# Patient Record
Sex: FEMALE | Race: BLACK OR AFRICAN AMERICAN | Employment: PART TIME | URBAN - METROPOLITAN AREA
[De-identification: names, ages, dates, MRNs, and addresses within clinical notes are randomized per-mention and may not be internally consistent; named-entity substitution may affect disease eponyms.]

---

## 2017-12-30 ENCOUNTER — HOSPITAL ENCOUNTER (EMERGENCY)
Facility: CLINIC | Age: 32
Discharge: HOME | End: 2017-12-30
Attending: EMERGENCY MEDICINE | Admitting: EMERGENCY MEDICINE
Payer: COMMERCIAL

## 2017-12-30 VITALS
OXYGEN SATURATION: 100 % | RESPIRATION RATE: 17 BRPM | HEART RATE: 126 BPM | DIASTOLIC BLOOD PRESSURE: 65 MMHG | SYSTOLIC BLOOD PRESSURE: 115 MMHG | TEMPERATURE: 98.1 F

## 2017-12-30 DIAGNOSIS — R00.2 PALPITATIONS: ICD-10-CM

## 2017-12-30 LAB
ALBUMIN SERPL-MCNC: 2.6 G/DL (ref 3.4–5)
ALP SERPL-CCNC: 125 U/L (ref 40–150)
ALT SERPL W P-5'-P-CCNC: 20 U/L (ref 0–50)
ANION GAP SERPL CALCULATED.3IONS-SCNC: 9 MMOL/L (ref 3–14)
AST SERPL W P-5'-P-CCNC: 19 U/L (ref 0–45)
BASOPHILS # BLD AUTO: 0 10E9/L (ref 0–0.2)
BASOPHILS NFR BLD AUTO: 0.2 %
BILIRUB SERPL-MCNC: 0.1 MG/DL (ref 0.2–1.3)
BUN SERPL-MCNC: 5 MG/DL (ref 7–30)
CALCIUM SERPL-MCNC: 8.4 MG/DL (ref 8.5–10.1)
CHLORIDE SERPL-SCNC: 104 MMOL/L (ref 94–109)
CO2 SERPL-SCNC: 23 MMOL/L (ref 20–32)
CREAT SERPL-MCNC: 0.54 MG/DL (ref 0.52–1.04)
D DIMER PPP FEU-MCNC: <0.3 UG/ML FEU (ref 0–0.5)
DIFFERENTIAL METHOD BLD: ABNORMAL
EOSINOPHIL # BLD AUTO: 0.2 10E9/L (ref 0–0.7)
EOSINOPHIL NFR BLD AUTO: 1.6 %
ERYTHROCYTE [DISTWIDTH] IN BLOOD BY AUTOMATED COUNT: 13.5 % (ref 10–15)
GFR SERPL CREATININE-BSD FRML MDRD: >90 ML/MIN/1.7M2
GLUCOSE SERPL-MCNC: 184 MG/DL (ref 70–99)
HCT VFR BLD AUTO: 33.7 % (ref 35–47)
HGB BLD-MCNC: 11.3 G/DL (ref 11.7–15.7)
IMM GRANULOCYTES # BLD: 0 10E9/L (ref 0–0.4)
IMM GRANULOCYTES NFR BLD: 0.3 %
INTERPRETATION ECG - MUSE: NORMAL
LYMPHOCYTES # BLD AUTO: 2.5 10E9/L (ref 0.8–5.3)
LYMPHOCYTES NFR BLD AUTO: 20.4 %
MCH RBC QN AUTO: 27.6 PG (ref 26.5–33)
MCHC RBC AUTO-ENTMCNC: 33.5 G/DL (ref 31.5–36.5)
MCV RBC AUTO: 82 FL (ref 78–100)
MONOCYTES # BLD AUTO: 0.7 10E9/L (ref 0–1.3)
MONOCYTES NFR BLD AUTO: 5.8 %
NEUTROPHILS # BLD AUTO: 8.9 10E9/L (ref 1.6–8.3)
NEUTROPHILS NFR BLD AUTO: 71.7 %
NRBC # BLD AUTO: 0 10*3/UL
NRBC BLD AUTO-RTO: 0 /100
PLATELET # BLD AUTO: 308 10E9/L (ref 150–450)
POTASSIUM SERPL-SCNC: 3.2 MMOL/L (ref 3.4–5.3)
PROT SERPL-MCNC: 7.3 G/DL (ref 6.8–8.8)
RBC # BLD AUTO: 4.09 10E12/L (ref 3.8–5.2)
SODIUM SERPL-SCNC: 136 MMOL/L (ref 133–144)
WBC # BLD AUTO: 12.4 10E9/L (ref 4–11)

## 2017-12-30 PROCEDURE — 93005 ELECTROCARDIOGRAM TRACING: CPT

## 2017-12-30 PROCEDURE — 25000128 H RX IP 250 OP 636: Performed by: EMERGENCY MEDICINE

## 2017-12-30 PROCEDURE — 25000132 ZZH RX MED GY IP 250 OP 250 PS 637: Performed by: EMERGENCY MEDICINE

## 2017-12-30 PROCEDURE — 85379 FIBRIN DEGRADATION QUANT: CPT | Performed by: EMERGENCY MEDICINE

## 2017-12-30 PROCEDURE — 80053 COMPREHEN METABOLIC PANEL: CPT | Performed by: EMERGENCY MEDICINE

## 2017-12-30 PROCEDURE — 85025 COMPLETE CBC W/AUTO DIFF WBC: CPT | Performed by: EMERGENCY MEDICINE

## 2017-12-30 PROCEDURE — 96360 HYDRATION IV INFUSION INIT: CPT

## 2017-12-30 PROCEDURE — 96361 HYDRATE IV INFUSION ADD-ON: CPT

## 2017-12-30 PROCEDURE — 99284 EMERGENCY DEPT VISIT MOD MDM: CPT | Mod: 25

## 2017-12-30 RX ORDER — SODIUM CHLORIDE 9 MG/ML
INJECTION, SOLUTION INTRAVENOUS CONTINUOUS
Status: DISCONTINUED | OUTPATIENT
Start: 2017-12-30 | End: 2017-12-30 | Stop reason: HOSPADM

## 2017-12-30 RX ORDER — POTASSIUM CHLORIDE 1500 MG/1
40 TABLET, EXTENDED RELEASE ORAL DAILY
Status: DISCONTINUED | OUTPATIENT
Start: 2017-12-30 | End: 2017-12-30 | Stop reason: HOSPADM

## 2017-12-30 RX ADMIN — POTASSIUM CHLORIDE 40 MEQ: 1500 TABLET, EXTENDED RELEASE ORAL at 15:39

## 2017-12-30 RX ADMIN — SODIUM CHLORIDE 1000 ML: 9 INJECTION, SOLUTION INTRAVENOUS at 15:39

## 2017-12-30 RX ADMIN — SODIUM CHLORIDE 1000 ML: 9 INJECTION, SOLUTION INTRAVENOUS at 14:38

## 2017-12-30 ASSESSMENT — ENCOUNTER SYMPTOMS
ABDOMINAL PAIN: 0
SHORTNESS OF BREATH: 1
NUMBNESS: 1
PALPITATIONS: 1

## 2017-12-30 NOTE — ED NOTES
2nd liter of NS completed. Pt ambulated and HR increased to 120s. Did not elevate any more than that. No hypoxia. Dr. Hubbard made aware.

## 2017-12-30 NOTE — ED AVS SNAPSHOT
Emergency Department    [address] [facility] [address]    Phone:  [phone]    Fax:  [phone]                                       Ema Khan   MRN: [id number]    Department:   Emergency Department   Date of Visit:  12/30/2017           Patient Information     Date Of Birth          1985        Your diagnoses for this visit were:     Palpitations        You were seen by Pearl Hubbard MD.      Follow-up Information     Follow up with your OB provider. Schedule an appointment as soon as possible for a visit in 2 days.        Follow up with  Emergency Department.    Specialty:  EMERGENCY MEDICINE    Why:  As needed, If symptoms worsen    Contact information:    [address] [facility] [address] [phone]        Schedule an appointment as soon as possible for a visit with [facility]    Why:  As needed    Contact information:    [facility], [address]  [address]  [facility] [address]          Discharge Instructions         Discharge Instructions  Palpitations    Palpitations are an unusual awareness of your heartbeat. People often describe this as the heart skipping, fluttering, racing, irregular, or pounding. At this time, your doctor has found no signs that your palpitations are due to a serious or life-threatening condition. However, sometimes there is a serious problem that does not show up right away. It is important that you follow up with your doctor within 1 week, or as directed by your doctor today, to check for other serious problems. You may need more blood tests, a stress test, heart monitoring, or other tests.    Palpitations can be caused by caffeine, cigarettes, diet pills, energy drinks or supplements, other stimulants, and medications and street drugs. They can also be caused by anxiety, hormone conditions such as high thyroid, and other medical conditions. Sometimes they are a sign of abnormal  rhythm in the heart, so you may need your heart checked.    Return to the Emergency Department if:    You get chest pain or tightness.    You are short of breath.    You get very weak or tired.    You pass out or faint.    Your heart rate is over 120 beats per minute for more than 10 minutes while you are resting.    You have any new symptoms, like fever, cough, numb legs, or you cough up blood.    You have anything else that worries you.    What can I do to help myself?    Fill any prescriptions the doctor gave you and take them right away.     Follow your doctor s instructions about the prescription medicines you are on. Sometimes the doctor may tell you to stop taking a medicine or change the dose.    If you smoke, this may be a good time to quit! The less you can smoke, the better.    Do not use energy drinks, diet pills, or stimulants. Limit your use of caffeine.    Follow up with your doctor:    Within 1 week, or sooner if instructed.    If you keep having palpitations.    If you need help to quit smoking.  If you were given a prescription for medicine here today, be sure to read all of the information (including the package insert) that comes with your prescription.  This will include important information about the medicine, its side effects, and any warnings that you need to know about.  The pharmacist who fills the prescription can provide more information and answer questions you may have about the medicine.  If you have questions or concerns that the pharmacist cannot address, please call or return to the Emergency Department.         Opioid Medication Information    Pain medications are among the most commonly prescribed medicines, so we are including this information for all our patients. If you did not receive pain medication or get a prescription for pain medicine, you can ignore it.     You may have been given a prescription for an opioid (narcotic) pain medicine and/or have received a pain medicine  while here in the Emergency Department. These medicines can make you drowsy or impaired. You must not drive, operate dangerous equipment, or engage in any other dangerous activities while taking these medications. If you drive while taking these medications, you could be arrested for DUI, or driving under the influence. Do not drink any alcohol while you are taking these medications.     Opioid pain medications can cause addiction. If you have a history of chemical dependency of any type, you are at a higher risk of becoming addicted to pain medications.  Only take these prescribed medications to treat your pain when all other options have been tried. Take it for as short a time and as few doses as possible. Store your pain pills in a secure place, as they are frequently stolen and provide a dangerous opportunity for children or visitors in your house to start abusing these powerful medications. We will not replace any lost or stolen medicine.  As soon as your pain is better, you should flush all your remaining medication.     Many prescription pain medications contain Tylenol  (acetaminophen), including Vicodin , Tylenol #3 , Norco , Lortab , and Percocet .  You should not take any extra pills of Tylenol  if you are using these prescription medications or you can get very sick.  Do not ever take more than 3000 mg of acetaminophen in any 24 hour period.    All opioids tend to cause constipation. Drink plenty of water and eat foods that have a lot of fiber, such as fruits, vegetables, prune juice, apple juice and high fiber cereal.  Take a laxative if you don t move your bowels at least every other day. Miralax , Milk of Magnesia, Colace , or Senna  can be used to keep you regular.      Remember that you can always come back to the Emergency Department if you are not able to see your regular doctor in the amount of time listed above, if you get any new symptoms, or if there is anything that worries you.        24  Hour Appointment Hotline       To make an appointment at any [facility], call [phone] ([phone]). If you don't have a family doctor or clinic, we will help you find one. [facility] are conveniently located to serve the needs of you and your family.             Review of your medicines      Our records show that you are taking the medicines listed below. If these are incorrect, please call your family doctor or clinic.        Dose / Directions Last dose taken    guaiFENesin-codeine 100-10 MG/5ML Soln solution   Commonly known as:  ROBITUSSIN AC   Dose:  2 tsp.   Quantity:  120 mL        Take 10 mLs by mouth every 4 hours as needed for cough No driving a car or drinking alcohol for 6 hours after taking this medication.   Refills:  0        ibuprofen 400-800 mg tablet   Commonly known as:  ADVIL,MOTRIN   Dose:  400-800 mg   Quantity:  60 tablet        Take 1-2 tablets by mouth every 6 hours as needed.   Refills:  0        methyldopa 250 MG tablet   Commonly known as:  ALDOMET   Dose:  250 mg        Take 250 mg by mouth 2 times daily.   Refills:  0        NIFEDIPINE PO        Refills:  0                Procedures and tests performed during your visit     CBC with platelets differential    Comprehensive metabolic panel    D dimer quantitative    EKG 12-lead, tracing only      Orders Needing Specimen Collection     None      Pending Results     Date and Time Order Name Status Description    12/30/2017 1407 EKG 12-lead, tracing only Preliminary             Pending Culture Results     No orders found from 12/28/2017 to 12/31/2017.            Pending Results Instructions     If you had any lab results that were not finalized at the time of your Discharge, you can call the ED Lab Result RN at [phone]. You will be contacted by this team for any positive Lab results or changes in treatment. The nurses are available 7 days a week from 10A to 6:30P.  You can leave a message 24 hours per day and they  will return your call.        Test Results From Your Hospital Stay        12/30/2017  2:32 PM      Component Results     Component Value Ref Range & Units Status    WBC 12.4 (H) 4.0 - 11.0 10e9/L Final    RBC Count 4.09 3.8 - 5.2 10e12/L Final    Hemoglobin 11.3 (L) 11.7 - 15.7 g/dL Final    Hematocrit 33.7 (L) 35.0 - 47.0 % Final    MCV 82 78 - 100 fl Final    MCH 27.6 26.5 - 33.0 pg Final    MCHC 33.5 31.5 - 36.5 g/dL Final    RDW 13.5 10.0 - 15.0 % Final    Platelet Count 308 150 - 450 10e9/L Final    Diff Method Automated Method  Final    % Neutrophils 71.7 % Final    % Lymphocytes 20.4 % Final    % Monocytes 5.8 % Final    % Eosinophils 1.6 % Final    % Basophils 0.2 % Final    % Immature Granulocytes 0.3 % Final    Nucleated RBCs 0 0 /100 Final    Absolute Neutrophil 8.9 (H) 1.6 - 8.3 10e9/L Final    Absolute Lymphocytes 2.5 0.8 - 5.3 10e9/L Final    Absolute Monocytes 0.7 0.0 - 1.3 10e9/L Final    Absolute Eosinophils 0.2 0.0 - 0.7 10e9/L Final    Absolute Basophils 0.0 0.0 - 0.2 10e9/L Final    Abs Immature Granulocytes 0.0 0 - 0.4 10e9/L Final    Absolute Nucleated RBC 0.0  Final         12/30/2017  2:56 PM      Component Results     Component Value Ref Range & Units Status    Sodium 136 133 - 144 mmol/L Final    Potassium 3.2 (L) 3.4 - 5.3 mmol/L Final    Chloride 104 94 - 109 mmol/L Final    Carbon Dioxide 23 20 - 32 mmol/L Final    Anion Gap 9 3 - 14 mmol/L Final    Glucose 184 (H) 70 - 99 mg/dL Final    Urea Nitrogen 5 (L) 7 - 30 mg/dL Final    Creatinine 0.54 0.52 - 1.04 mg/dL Final    GFR Estimate >90 >60 mL/min/1.7m2 Final    Non  GFR Calc    GFR Estimate If Black >90 >60 mL/min/1.7m2 Final    African American GFR Calc    Calcium 8.4 (L) 8.5 - 10.1 mg/dL Final    Bilirubin Total 0.1 (L) 0.2 - 1.3 mg/dL Final    Albumin 2.6 (L) 3.4 - 5.0 g/dL Final    Protein Total 7.3 6.8 - 8.8 g/dL Final    Alkaline Phosphatase 125 40 - 150 U/L Final    ALT 20 0 - 50 U/L Final    AST 19 0 - 45 U/L  Final         12/30/2017  3:02 PM      Component Results     Component Value Ref Range & Units Status    D Dimer <0.3 0.0 - 0.50 ug/ml FEU Final    This D-dimer assay is intended for use in conjunction with a clinical pretest   probability assessment model to exclude pulmonary embolism (PE) and deep   venous thrombosis (DVT) in outpatients suspected of PE or DVT. The cut-off   value is 0.5 ug/mL FEU.                  Clinical Quality Measure: Blood Pressure Screening     Your blood pressure was checked while you were in the emergency department today. The last reading we obtained was  BP: 115/65 . Please read the guidelines below about what these numbers mean and what you should do about them.  If your systolic blood pressure (the top number) is less than 120 and your diastolic blood pressure (the bottom number) is less than 80, then your blood pressure is normal. There is nothing more that you need to do about it.  If your systolic blood pressure (the top number) is 120-139 or your diastolic blood pressure (the bottom number) is 80-89, your blood pressure may be higher than it should be. You should have your blood pressure rechecked within a year by a primary care provider.  If your systolic blood pressure (the top number) is 140 or greater or your diastolic blood pressure (the bottom number) is 90 or greater, you may have high blood pressure. High blood pressure is treatable, but if left untreated over time it can put you at risk for heart attack, stroke, or kidney failure. You should have your blood pressure rechecked by a primary care provider within the next 4 weeks.  If your provider in the emergency department today gave you specific instructions to follow-up with your doctor or provider even sooner than that, you should follow that instruction and not wait for up to 4 weeks for your follow-up visit.        Thank you for choosing [facility]       Thank you for choosing [facility] for your care. Our goal is always  "to provide you with excellent care. Hearing back from our patients is one way we can continue to improve our services. Please take a few minutes to complete the written survey that you may receive in the mail after you visit with us. Thank you!        [employer] Information     [employer] lets you send messages to your doctor, view your test results, renew your prescriptions, schedule appointments and more. To sign up, go to [email or web address] . Click on \"Log in\" on the left side of the screen, which will take you to the Welcome page. Then click on \"Sign up Now\" on the right side of the page.     You will be asked to enter the access code listed below, as well as some personal information. Please follow the directions to create your username and password.     Your access code is: [email or web address]  Expires: 3/30/2018  5:10 PM     Your access code will  in 90 days. If you need help or a new code, please call your [facility] clinic or [phone].        Care EveryWhere ID     This is your Care EveryWhere ID. This could be used by other organizations to access your [facility] medical records  [id number]        Equal Access to Services     NEFTALY MANJARREZ : Jalen Huang, angela pabon, noelle rocha, elliott white. So [facility] [phone].    ATENCIÓN: Si habla español, tiene a strickland disposición servicios gratuitos de asistencia lingüística. King al [phone].    We comply with applicable federal civil rights laws and [place] laws. We do not discriminate on the basis of race, color, national origin, age, disability, sex, sexual orientation, or gender identity.            After Visit Summary       This is your record. Keep this with you and show to your community pharmacist(s) and doctor(s) at your next visit.                  "

## 2017-12-30 NOTE — ED NOTES
Bed: ED19  Expected date:   Expected time:   Means of arrival:   Comments:  Hugo Donaldson Preg 22 weeks with Chest pain and Tachycardia 32 female ETA 1400

## 2017-12-30 NOTE — ED AVS SNAPSHOT
Emergency Department    [address] [facility] [address]    Phone:  [phone]    Fax:  [phone]                                       Ema Khan   MRN: [id number]    Department:   Emergency Department   Date of Visit:  12/30/2017           After Visit Summary Signature Page     I have received my discharge instructions, and my questions have been answered. I have discussed any challenges I see with this plan with the nurse or doctor.    ..........................................................................................................................................  Patient/Patient Representative Signature      ..........................................................................................................................................  Patient Representative Print Name and Relationship to Patient    ..................................................               ................................................  Date                                            Time    ..........................................................................................................................................  Reviewed by Signature/Title    ...................................................              ..............................................  Date                                                            Time

## 2017-12-30 NOTE — ED PROVIDER NOTES
History     Chief Complaint:  Palpitations       HPI   Ema Khan is a 32 year old female, who is 22 weeks pregnant, who presents with palpitations beginning an hour or so ago once she sat down at work this morning. She states she took her high blood pressure medication then since she forgot to take it earlier in the day. She continued to feel more faint and had tingling in her hands, and she went to go get food. The tingling and palpitations continued, so she called the nurse line who sent her to the ER. Patient endorses shortness of breath with movement and ankle swelling. Patient denies abdominal pain. Patient has two live children.     PE/DVT Risk Factors   Personal History: Negative  Recent Travel: Negative   Recent Surgery/Hospitalization: Negative  Tobacco: Negative  Family History: Negative  Hormone Use: Negative, however patient is pregnant  Cancer: Negative  Trauma: Negative      Allergies:  Amoxicillin  Penicillins     Medications:    Nifedipine  Robitussin  Advil  Aldomet    Past Medical History:    HTN    Past Surgical History:    History reviewed. No pertinent past surgical history.    Family History:    History reviewed. No pertinent family history.    Social History:  Marital Status: Single  Presents to the ED via EMS.   Tobacco Use: Former smoker (for 15 years, quit date 1/8/2012)  Alcohol Use: No       Review of Systems   Respiratory: Positive for shortness of breath.    Cardiovascular: Positive for palpitations and leg swelling.   Gastrointestinal: Negative for abdominal pain.   Neurological: Positive for numbness.   10 point review of systems performed and is negative except as above and in HPI.    Physical Exam   First Vitals:  BP: 134/71  Pulse: 126  Temp: 98.1  F (36.7  C)  Resp: 16  SpO2: 99 %      Physical Exam  General: Resting on the gurney, appears somewhat uncomfortable  Head:  The scalp, face, and head appear normal  Mouth/Throat: Mucus membranes are moist  CV:  Tachycardic.      Normal S1 and S2  No pathological murmur   Resp:  Breath sounds clear and equal bilaterally    Non-labored, no retractions or accessory muscle use    No coarseness    No wheezing   GI:  Abdomen is soft, no rigidity    No tenderness to palpation  MS:  Normal motor assessment of all extremities.    Good capillary refill noted.    No lower edema, redness, swelling, excess warmth   Skin:  No rash or lesions noted.  Neuro:  Speech is normal and fluent. No apparent deficit.  Psych:  Awake. Alert.  Normal affect.      Appropriate interactions.    Emergency Department Course   ECG:  @ 1419  Indication: Palpitations   Vent. Rate 123 bpm. WI interval 152 ms. QRS duration 62 ms. QT/QTc 302/432 ms. P-R-T axis 45 41 2.   Sinus tachycardia. Nonspecific T wave abnormality. Abnormal ECG.    Read @ 1430 by Dr. Hubbard.    Laboratory:  CBC:  WBC 12.4 (H), HGB 11.3 (L), , otherwise WNL  CMP: Potassium 3.2 (L), glucose 184 (H), BUN 5 (L), calcium 8.4 (L), bilirubin 0.1 (L), albumin 2.6 (L), otherwise WNL (Creatinine 0.54)  D Dimer: <0.3    Interventions:  1438: Normal Saline, 1 liter, IV bolus   1539: Normal Saline, 1 liter, IV bolus   1539: Potassium chloride, 40 mEq, oral    Emergency Department Course:  Nursing notes and vitals reviewed.  1407: I performed an exam of the patient as documented above.  The above workup was undertaken.  1527: I rechecked the patient and discussed results.  1656: I consulted with TAYLOR Perez of [facility].  1657: I rechecked the patient and discussed results.   Findings and plan explained to the Patient. Patient discharged home, status improved, with instructions regarding supportive care, medications, and reasons to return as well as the importance of close follow-up was reviewed.    Impression & Plan      Medical Decision Making:  Ema Khan is a 32 year old female presented with a sensation of palpitations.  The work up in the Emergency Department is overall  negative, monitoring and EKG have not shown any abnormal heart rhythms or concerning morphologies.  I considered a broad differential diagnosis including acute coronary syndrome, myocardial infarction, pulmonary embolism, electrolyte abnormalities, drug reaction, anxiety, amongst others.  Electrolytes are normal besides a low potassium which was replaced in the ED. The patient is not anemic. The patient is pregnant.  No EKG changes or troponin elevation concerning for acute coronary syndrome.  D-dimer is negative so I doubt PE.  No serious etiology for the palpitations were detected today during this visit and I feel the patient is safe for discharge.   Close follow up with primary care is indicated should the symptoms continue, as further work up may be performed; this was made clear to the patient, who understands.     Diagnosis:    ICD-10-CM    1. Palpitations R00.2        Disposition:  Discharged to home.       I, Michelle Whitmore, am serving as a scribe on 12/30/2017 at 2:07 PM to personally document services performed by Dr. Hubbard based on my observations and the provider's statements to me.    EMERGENCY DEPARTMENT       Pearl Hubbard MD  01/02/18 0809

## 2018-08-29 ENCOUNTER — HOSPITAL ENCOUNTER (EMERGENCY)
Facility: CLINIC | Age: 33
Discharge: HOME | End: 2018-08-29
Attending: EMERGENCY MEDICINE | Admitting: EMERGENCY MEDICINE
Payer: COMMERCIAL

## 2018-08-29 VITALS
OXYGEN SATURATION: 100 % | TEMPERATURE: 98 F | WEIGHT: 187 LBS | HEIGHT: 65 IN | SYSTOLIC BLOOD PRESSURE: 121 MMHG | BODY MASS INDEX: 31.16 KG/M2 | DIASTOLIC BLOOD PRESSURE: 76 MMHG | RESPIRATION RATE: 16 BRPM

## 2018-08-29 DIAGNOSIS — R51.9 ACUTE NONINTRACTABLE HEADACHE, UNSPECIFIED HEADACHE TYPE: ICD-10-CM

## 2018-08-29 LAB — HCG SERPL QL: NEGATIVE

## 2018-08-29 PROCEDURE — 96361 HYDRATE IV INFUSION ADD-ON: CPT

## 2018-08-29 PROCEDURE — 99284 EMERGENCY DEPT VISIT MOD MDM: CPT | Mod: 25

## 2018-08-29 PROCEDURE — 96374 THER/PROPH/DIAG INJ IV PUSH: CPT

## 2018-08-29 PROCEDURE — 25000128 H RX IP 250 OP 636: Performed by: EMERGENCY MEDICINE

## 2018-08-29 PROCEDURE — 96375 TX/PRO/DX INJ NEW DRUG ADDON: CPT

## 2018-08-29 PROCEDURE — 84703 CHORIONIC GONADOTROPIN ASSAY: CPT | Performed by: EMERGENCY MEDICINE

## 2018-08-29 RX ORDER — SODIUM CHLORIDE 9 MG/ML
1000 INJECTION, SOLUTION INTRAVENOUS CONTINUOUS
Status: DISCONTINUED | OUTPATIENT
Start: 2018-08-29 | End: 2018-08-29 | Stop reason: HOSPADM

## 2018-08-29 RX ORDER — METOCLOPRAMIDE HYDROCHLORIDE 5 MG/ML
10 INJECTION INTRAMUSCULAR; INTRAVENOUS ONCE
Status: COMPLETED | OUTPATIENT
Start: 2018-08-29 | End: 2018-08-29

## 2018-08-29 RX ORDER — DIPHENHYDRAMINE HYDROCHLORIDE 50 MG/ML
25 INJECTION INTRAMUSCULAR; INTRAVENOUS ONCE
Status: COMPLETED | OUTPATIENT
Start: 2018-08-29 | End: 2018-08-29

## 2018-08-29 RX ADMIN — DIPHENHYDRAMINE HYDROCHLORIDE 25 MG: 50 INJECTION, SOLUTION INTRAMUSCULAR; INTRAVENOUS at 11:09

## 2018-08-29 RX ADMIN — SODIUM CHLORIDE 1000 ML: 9 INJECTION, SOLUTION INTRAVENOUS at 11:08

## 2018-08-29 RX ADMIN — METOCLOPRAMIDE 10 MG: 5 INJECTION, SOLUTION INTRAMUSCULAR; INTRAVENOUS at 11:09

## 2018-08-29 ASSESSMENT — ENCOUNTER SYMPTOMS
HEADACHES: 1
VOMITING: 1
PHOTOPHOBIA: 1
FEVER: 0
NAUSEA: 1
DIZZINESS: 1

## 2018-08-29 NOTE — ED AVS SNAPSHOT
Emergency Department    [address] [facility] [address]    Phone:  [phone]    Fax:  [phone]                                       Ema Khan   MRN: [id number]    Department:   Emergency Department   Date of Visit:  8/29/2018           After Visit Summary Signature Page     I have received my discharge instructions, and my questions have been answered. I have discussed any challenges I see with this plan with the nurse or doctor.    ..........................................................................................................................................  Patient/Patient Representative Signature      ..........................................................................................................................................  Patient Representative Print Name and Relationship to Patient    ..................................................               ................................................  Date                                            Time    ..........................................................................................................................................  Reviewed by Signature/Title    ...................................................              ..............................................  Date                                                            Time          22EPIC Rev 08/18

## 2018-08-29 NOTE — ED AVS SNAPSHOT
Emergency Department    [address] [facility] [address]    Phone:  [phone]    Fax:  [phone]                                       Ema Khan   MRN: [id number]    Department:   Emergency Department   Date of Visit:  8/29/2018           Patient Information     Date Of Birth          1985        Your diagnoses for this visit were:     Acute nonintractable headache, unspecified headache type        You were seen by Ant Lomeli MD.      Follow-up Information     Follow up with  Emergency Department.    Specialty:  EMERGENCY MEDICINE    Why:  As needed    Contact information:    [address] [facility] [address] [phone]        Follow up with Primary Care.    Why:  As needed        Discharge Instructions       Discharge Instructions  Headache    You were seen today for a headache. Headaches may be caused by many different things such as muscle tension, sinus inflammation, anxiety and stress, having too little sleep, too much alcohol, some medical conditions or injury. You may have a migraine, which is caused by changes in the blood vessels in your head.  At this time your provider does not find that your headache is a sign of anything dangerous or life-threatening.  However, sometimes the signs of serious illness do not show up right away.      Generally, every Emergency Department visit should have a follow-up clinic visit with either a primary or a specialty clinic/provider. Please follow-up as instructed by your emergency provider today.    Return to the Emergency Department if:    You get a new fever of 100.4 F or higher.    Your headache gets much worse.    You get a stiff neck with your headache.    You get a new headache that is significantly different or worse than headaches you have had before.    You are vomiting (throwing up) and cannot keep food or water down.    You have blurry or double vision or other problems with your  eyes.    You have a new weakness on one side of your body.    You have difficulty with balance which is new.    You or your family thinks you are confused.    You have a seizure.    What can I do to help myself?    Pain medications - You may take a pain medication such as Tylenol  (acetaminophen), Advil , Motrin  (ibuprofen) or Aleve  (naproxen).    Take a pain reliever as soon as you notice symptoms.  Starting medications as soon as you start to have symptoms may lessen the amount of pain you have.    Relaxing in a quiet, dark room may help.    Get enough sleep and eat meals regularly.    You may need to watch for certain foods or other things which may trigger your headaches.  Keeping a journal of your headaches and possible triggers may help you and your primary provider to identify things which you should avoid which may be causing your headaches.  If you were given a prescription for medicine here today, be sure to read all of the information (including the package insert) that comes with your prescription.  This will include important information about the medicine, its side effects, and any warnings that you need to know about.  The pharmacist who fills the prescription can provide more information and answer questions you may have about the medicine.  If you have questions or concerns that the pharmacist cannot address, please call or return to the Emergency Department.   Remember that you can always come back to the Emergency Department if you are not able to see your regular provider in the amount of time listed above, if you get any new symptoms, or if there is anything that worries you.     24 Hour Appointment Hotline       To make an appointment at any [facility] call [phone] ([phone]). If you don't have a family doctor or clinic, we will help you find one. [facility] are conveniently located to serve the needs of you and your family.             Review of your medicines       Our records show that you are taking the medicines listed below. If these are incorrect, please call your family doctor or clinic.        Dose / Directions Last dose taken    guaiFENesin-codeine 100-10 MG/5ML Soln solution   Commonly known as:  ROBITUSSIN AC   Dose:  2 tsp.   Quantity:  120 mL        Take 10 mLs by mouth every 4 hours as needed for cough No driving a car or drinking alcohol for 6 hours after taking this medication.   Refills:  0        ibuprofen 400-800 mg tablet   Commonly known as:  ADVIL,MOTRIN   Dose:  400-800 mg   Quantity:  60 tablet        Take 1-2 tablets by mouth every 6 hours as needed.   Refills:  0        methyldopa 250 MG tablet   Commonly known as:  ALDOMET   Dose:  250 mg        Take 250 mg by mouth 2 times daily.   Refills:  0        NIFEDIPINE PO        Refills:  0                Procedures and tests performed during your visit     HCG qualitative Blood    Peripheral IV catheter      Orders Needing Specimen Collection     None      Pending Results     No orders found from 8/27/2018 to 8/30/2018.            Pending Culture Results     No orders found from 8/27/2018 to 8/30/2018.            Pending Results Instructions     If you had any lab results that were not finalized at the time of your Discharge, you can call the ED Lab Result RN at [phone]. You will be contacted by this team for any positive Lab results or changes in treatment. The nurses are available 7 days a week from 10A to 6:30P.  You can leave a message 24 hours per day and they will return your call.        Test Results From Your Hospital Stay        8/29/2018 11:36 AM      Component Results     Component Value Ref Range & Units Status    HCG Qualitative Serum Negative NEG^Negative Final    This test is for screening purposes.  Results should be interpreted along with   the clinical picture.  Confirmation testing is available if warranted by   ordering FFM178, HCG Quantitative Pregnancy.                  Clinical  Quality Measure: Blood Pressure Screening     Your blood pressure was checked while you were in the emergency department today. The last reading we obtained was  BP: 121/76 . Please read the guidelines below about what these numbers mean and what you should do about them.  If your systolic blood pressure (the top number) is less than 120 and your diastolic blood pressure (the bottom number) is less than 80, then your blood pressure is normal. There is nothing more that you need to do about it.  If your systolic blood pressure (the top number) is 120-139 or your diastolic blood pressure (the bottom number) is 80-89, your blood pressure may be higher than it should be. You should have your blood pressure rechecked within a year by a primary care provider.  If your systolic blood pressure (the top number) is 140 or greater or your diastolic blood pressure (the bottom number) is 90 or greater, you may have high blood pressure. High blood pressure is treatable, but if left untreated over time it can put you at risk for heart attack, stroke, or kidney failure. You should have your blood pressure rechecked by a primary care provider within the next 4 weeks.  If your provider in the emergency department today gave you specific instructions to follow-up with your doctor or provider even sooner than that, you should follow that instruction and not wait for up to 4 weeks for your follow-up visit.        Thank you for choosing [facility]       Thank you for choosing [facility] for your care. Our goal is always to provide you with excellent care. Hearing back from our patients is one way we can continue to improve our services. Please take a few minutes to complete the written survey that you may receive in the mail after you visit with us. Thank you!        [employer] Information     [employer] lets you send messages to your doctor, view your test results, renew your prescriptions, schedule appointments and more. To sign up, go to  [email or web address] . Click on \"Log in\" on the left side of the screen, which will take you to the Welcome page. Then click on \"Sign up Now\" on the right side of the page.     You will be asked to enter the access code listed below, as well as some personal information. Please follow the directions to create your username and password.     Your access code is: [email or web address]  Expires: 2018 12:18 PM     Your access code will  in 90 days. If you need help or a new code, please call your [facility] clinic or [phone].        Care EveryWhere ID     This is your Care EveryWhere ID. This could be used by other organizations to access your [facility] medical records  [id number]        Equal Access to Services     NEFTALY MANJARREZ : Jalen Huang, angela pabon, noelle rocha, elliott white. So [facility] [phone].    ATENCIÓN: Si habla español, tiene a strickland disposición servicios gratuitos de asistencia lingüística. Llame al [phone].    We comply with applicable federal civil rights laws and [place] laws. We do not discriminate on the basis of race, color, national origin, age, disability, sex, sexual orientation, or gender identity.            After Visit Summary       This is your record. Keep this with you and show to your community pharmacist(s) and doctor(s) at your next visit.                  "

## 2018-08-29 NOTE — DISCHARGE INSTRUCTIONS
Discharge Instructions  Headache    You were seen today for a headache. Headaches may be caused by many different things such as muscle tension, sinus inflammation, anxiety and stress, having too little sleep, too much alcohol, some medical conditions or injury. You may have a migraine, which is caused by changes in the blood vessels in your head.  At this time your provider does not find that your headache is a sign of anything dangerous or life-threatening.  However, sometimes the signs of serious illness do not show up right away.      Generally, every Emergency Department visit should have a follow-up clinic visit with either a primary or a specialty clinic/provider. Please follow-up as instructed by your emergency provider today.    Return to the Emergency Department if:    You get a new fever of 100.4 F or higher.    Your headache gets much worse.    You get a stiff neck with your headache.    You get a new headache that is significantly different or worse than headaches you have had before.    You are vomiting (throwing up) and cannot keep food or water down.    You have blurry or double vision or other problems with your eyes.    You have a new weakness on one side of your body.    You have difficulty with balance which is new.    You or your family thinks you are confused.    You have a seizure.    What can I do to help myself?    Pain medications - You may take a pain medication such as Tylenol  (acetaminophen), Advil , Motrin  (ibuprofen) or Aleve  (naproxen).    Take a pain reliever as soon as you notice symptoms.  Starting medications as soon as you start to have symptoms may lessen the amount of pain you have.    Relaxing in a quiet, dark room may help.    Get enough sleep and eat meals regularly.    You may need to watch for certain foods or other things which may trigger your headaches.  Keeping a journal of your headaches and possible triggers may help you and your primary provider to identify  things which you should avoid which may be causing your headaches.  If you were given a prescription for medicine here today, be sure to read all of the information (including the package insert) that comes with your prescription.  This will include important information about the medicine, its side effects, and any warnings that you need to know about.  The pharmacist who fills the prescription can provide more information and answer questions you may have about the medicine.  If you have questions or concerns that the pharmacist cannot address, please call or return to the Emergency Department.   Remember that you can always come back to the Emergency Department if you are not able to see your regular provider in the amount of time listed above, if you get any new symptoms, or if there is anything that worries you.

## 2018-08-29 NOTE — ED PROVIDER NOTES
"  History     Chief Complaint:  Headache    HPI   Ema Khan is a 33 year old female who presents to the emergency department today for evaluation of a headache. The patient reports that on Monday, 08/27/18, she started to have a headache and was dizzy and vomited twice. She states she went to bed Monday and woke up on Tuesday, 08/28/18, with the same pounding headache and was dizzy and threw up. She states she took ibuprofen Tuesday, 08/28/18, which did not get rid of her headache but relieved it somewhat. She states then this morning she started to see stars, which she was told to come into the ED due to her high blood pressure. She also endorses some nausea and feeling shaky. The patient denies leg swelling, but she notes her legs were \"burning\" this morning. The patient also denies fevers.    Allergies:  Amoxicillin  Penicillins    Medications:    guaiFENesin-codeine (ROBITUSSIN AC) 100-10 MG/5ML SOLN  ibuprofen (ADVIL,MOTRIN) 400-800 mg tablet  methyldopa (ALDOMET) 250 MG tablet  NIFEDIPINE PO    Past Medical History:    Hypertension    Past Surgical History:    History reviewed. No pertinent surgical history.    Family History:    History reviewed. No pertinent family history.    Social History:  Smoking Status: Former Smoker  Smokeless Tobacco: Former User  Alcohol Use: Negative    Marital Status:  Single     Review of Systems   Constitutional: Negative for fever.   Eyes: Positive for photophobia and visual disturbance.   Cardiovascular: Negative for leg swelling.   Gastrointestinal: Positive for nausea and vomiting.   Neurological: Positive for dizziness and headaches.   All other systems reviewed and are negative.    Physical Exam     Patient Vitals for the past 24 hrs:   BP Temp Temp src Heart Rate Resp SpO2 Height Weight   08/29/18 1200 121/76 - - - - 100 % - -   08/29/18 1145 - - - - - 99 % - -   08/29/18 1130 (!) 130/99 - - - - 100 % - -   08/29/18 1110 134/85 - - - - 100 % - -   08/29/18 1055 " "(!) 141/100 98  F (36.7  C) Oral 102 16 99 % 1.651 m (5' 5\") 84.8 kg (187 lb)      Physical Exam  Eyes:  The pupils are equal and round    Conjunctivae and sclerae are normal  ENT:    The nose is normal    Pinnae are normal  CV:  Regular rate and rhythm     No edema  Resp:  Lungs are clear    Non-labored    No rales    No wheezing   GI:  Abdomen is soft and non-tender, there is no rigidity    No distension  MS:  Normal muscular tone    No asymmetric leg swelling  Skin:  No rash or acute skin lesions noted  Neuro:   Awake, alert, GCS 15    Speech is normal and fluent    Face is symmetric    Moves all extremities    Normal finger-nose-finger     strength equal bilaterally    Equal sensation bilaterally    Hip flexion 5/5 bilaterally     PERRL    EOMI. No nystagmus    Emergency Department Course     Laboratory:  Laboratory findings were communicated with the patient who voiced understanding of the findings.    HCG Qualitative Blood: negative    Interventions:  1108 NS 1000 mL IV  1109 Reglan 10 mg IV  1109 Benadryl 25 mg IV    Emergency Department Course:    1053 Nursing notes and vitals reviewed.    1059 I performed an exam of the patient as documented above.     1100 IV was inserted and blood was drawn for laboratory testing, results above.     1130 Recheck and update.     1217 I personally reviewed the laboratory results with the patient and answered all related questions  prior to discharge.    Impression & Plan      Medical Decision Making:  Ema Khan is a 33 year old female who presents to the emergency department today for evaluation of headache. It has been ongoing for about 3 days now. She initially had some improvement with ibuprofen but has return of her headache which was severe this morning. She took an ibuprofen this morning about an hour prior to arrival, but continues to have a headache. She reports she is seeing stars. Neurologic exam here is normal. Blood pressure is slightly elevated. She " was given IV fluids as well as Reglan and benadryl and reports resolution of her headache. She is drowsy so I ddi recommend someone give her a ride home. At this time, I have low suspicion for meningitis,m hemorrhage, or other serious cause of headache. I recommend she rest today and and follow up with primary care. Return to the ED for any new or concerning symptoms.     Diagnosis:    ICD-10-CM    1. Acute nonintractable headache, unspecified headache type R51      Disposition:   The patient is discharged to home.     Discharge Medications:  No discharge medications    Scribe Disclosure:  I, Shanti Mota, am serving as a scribe at 10:53 AM on 8/29/2018 to document services personally performed by Ant Lomeli MD based on my observations and the provider's statements to me.          EMERGENCY DEPARTMENT       Ant Lomeli MD  08/29/18 6768

## 2021-06-07 ENCOUNTER — HOSPITAL ENCOUNTER (INPATIENT)
Facility: CLINIC | Age: 36
LOS: 2 days | Discharge: HOME | DRG: 831 | End: 2021-06-09
Attending: EMERGENCY MEDICINE | Admitting: OBSTETRICS & GYNECOLOGY
Payer: COMMERCIAL

## 2021-06-07 ENCOUNTER — APPOINTMENT (OUTPATIENT)
Dept: CT IMAGING | Facility: CLINIC | Age: 36
DRG: 831 | End: 2021-06-07
Attending: EMERGENCY MEDICINE
Payer: COMMERCIAL

## 2021-06-07 DIAGNOSIS — J18.9 PNEUMONIA OF LEFT LUNG DUE TO INFECTIOUS ORGANISM, UNSPECIFIED PART OF LUNG: ICD-10-CM

## 2021-06-07 DIAGNOSIS — O13.2 PREGNANCY-INDUCED HYPERTENSION IN SECOND TRIMESTER: ICD-10-CM

## 2021-06-07 LAB
ALBUMIN SERPL-MCNC: 2.7 G/DL (ref 3.4–5)
ALBUMIN UR-MCNC: 10 MG/DL
ALP SERPL-CCNC: 104 U/L (ref 40–150)
ALT SERPL W P-5'-P-CCNC: 14 U/L (ref 0–50)
ANION GAP SERPL CALCULATED.3IONS-SCNC: 8 MMOL/L (ref 3–14)
APPEARANCE UR: CLEAR
AST SERPL W P-5'-P-CCNC: 14 U/L (ref 0–45)
BASOPHILS # BLD AUTO: 0.1 10E9/L (ref 0–0.2)
BASOPHILS NFR BLD AUTO: 0.3 %
BILIRUB DIRECT SERPL-MCNC: <0.1 MG/DL (ref 0–0.2)
BILIRUB SERPL-MCNC: 0.4 MG/DL (ref 0.2–1.3)
BILIRUB UR QL STRIP: NEGATIVE
BUN SERPL-MCNC: 4 MG/DL (ref 7–30)
CALCIUM SERPL-MCNC: 8.8 MG/DL (ref 8.5–10.1)
CHLORIDE SERPL-SCNC: 104 MMOL/L (ref 94–109)
CO2 SERPL-SCNC: 21 MMOL/L (ref 20–32)
COLOR UR AUTO: ABNORMAL
CREAT SERPL-MCNC: 0.43 MG/DL (ref 0.52–1.04)
D DIMER PPP FEU-MCNC: 0.7 UG/ML FEU (ref 0–0.5)
DIFFERENTIAL METHOD BLD: ABNORMAL
EOSINOPHIL # BLD AUTO: 0.1 10E9/L (ref 0–0.7)
EOSINOPHIL NFR BLD AUTO: 0.3 %
ERYTHROCYTE [DISTWIDTH] IN BLOOD BY AUTOMATED COUNT: 13.7 % (ref 10–15)
GFR SERPL CREATININE-BSD FRML MDRD: >90 ML/MIN/{1.73_M2}
GLUCOSE SERPL-MCNC: 107 MG/DL (ref 70–99)
GLUCOSE UR STRIP-MCNC: NEGATIVE MG/DL
HCT VFR BLD AUTO: 36.2 % (ref 35–47)
HGB BLD-MCNC: 11.8 G/DL (ref 11.7–15.7)
HGB UR QL STRIP: NEGATIVE
IMM GRANULOCYTES # BLD: 0.2 10E9/L (ref 0–0.4)
IMM GRANULOCYTES NFR BLD: 0.8 %
INTERPRETATION ECG - MUSE: NORMAL
KETONES UR STRIP-MCNC: 20 MG/DL
LABORATORY COMMENT REPORT: NORMAL
LEUKOCYTE ESTERASE UR QL STRIP: NEGATIVE
LYMPHOCYTES # BLD AUTO: 1.3 10E9/L (ref 0.8–5.3)
LYMPHOCYTES NFR BLD AUTO: 6.2 %
MCH RBC QN AUTO: 26.8 PG (ref 26.5–33)
MCHC RBC AUTO-ENTMCNC: 32.6 G/DL (ref 31.5–36.5)
MCV RBC AUTO: 82 FL (ref 78–100)
MONOCYTES # BLD AUTO: 1.1 10E9/L (ref 0–1.3)
MONOCYTES NFR BLD AUTO: 5.1 %
MUCOUS THREADS #/AREA URNS LPF: PRESENT /LPF
NEUTROPHILS # BLD AUTO: 18.3 10E9/L (ref 1.6–8.3)
NEUTROPHILS NFR BLD AUTO: 87.3 %
NITRATE UR QL: NEGATIVE
NRBC # BLD AUTO: 0 10*3/UL
NRBC BLD AUTO-RTO: 0 /100
PH UR STRIP: 7 PH (ref 5–7)
PLATELET # BLD AUTO: 263 10E9/L (ref 150–450)
POTASSIUM SERPL-SCNC: 3.3 MMOL/L (ref 3.4–5.3)
PROT SERPL-MCNC: 7.2 G/DL (ref 6.8–8.8)
RBC # BLD AUTO: 4.4 10E12/L (ref 3.8–5.2)
RBC #/AREA URNS AUTO: 2 /HPF (ref 0–2)
SARS-COV-2 RNA RESP QL NAA+PROBE: NEGATIVE
SODIUM SERPL-SCNC: 133 MMOL/L (ref 133–144)
SOURCE: ABNORMAL
SP GR UR STRIP: 1 (ref 1–1.03)
SPECIMEN SOURCE: NORMAL
SQUAMOUS #/AREA URNS AUTO: 9 /HPF (ref 0–1)
TROPONIN I SERPL-MCNC: <0.015 UG/L (ref 0–0.04)
UROBILINOGEN UR STRIP-MCNC: 0 MG/DL (ref 0–2)
WBC # BLD AUTO: 20.9 10E9/L (ref 4–11)
WBC #/AREA URNS AUTO: 1 /HPF (ref 0–5)

## 2021-06-07 PROCEDURE — 80053 COMPREHEN METABOLIC PANEL: CPT | Performed by: EMERGENCY MEDICINE

## 2021-06-07 PROCEDURE — 85025 COMPLETE CBC W/AUTO DIFF WBC: CPT | Performed by: EMERGENCY MEDICINE

## 2021-06-07 PROCEDURE — 120N000001 HC R&B MED SURG/OB

## 2021-06-07 PROCEDURE — 85379 FIBRIN DEGRADATION QUANT: CPT | Performed by: EMERGENCY MEDICINE

## 2021-06-07 PROCEDURE — 96375 TX/PRO/DX INJ NEW DRUG ADDON: CPT

## 2021-06-07 PROCEDURE — 250N000011 HC RX IP 250 OP 636: Performed by: EMERGENCY MEDICINE

## 2021-06-07 PROCEDURE — 82248 BILIRUBIN DIRECT: CPT | Performed by: EMERGENCY MEDICINE

## 2021-06-07 PROCEDURE — 94640 AIRWAY INHALATION TREATMENT: CPT

## 2021-06-07 PROCEDURE — 999N000157 HC STATISTIC RCP TIME EA 10 MIN

## 2021-06-07 PROCEDURE — 99285 EMERGENCY DEPT VISIT HI MDM: CPT | Mod: 25

## 2021-06-07 PROCEDURE — 93005 ELECTROCARDIOGRAM TRACING: CPT

## 2021-06-07 PROCEDURE — C9803 HOPD COVID-19 SPEC COLLECT: HCPCS

## 2021-06-07 PROCEDURE — 250N000013 HC RX MED GY IP 250 OP 250 PS 637: Performed by: PHYSICIAN ASSISTANT

## 2021-06-07 PROCEDURE — 250N000009 HC RX 250: Performed by: EMERGENCY MEDICINE

## 2021-06-07 PROCEDURE — 96367 TX/PROPH/DG ADDL SEQ IV INF: CPT

## 2021-06-07 PROCEDURE — 71275 CT ANGIOGRAPHY CHEST: CPT

## 2021-06-07 PROCEDURE — 99222 1ST HOSP IP/OBS MODERATE 55: CPT | Performed by: PHYSICIAN ASSISTANT

## 2021-06-07 PROCEDURE — 84484 ASSAY OF TROPONIN QUANT: CPT | Performed by: EMERGENCY MEDICINE

## 2021-06-07 PROCEDURE — 87635 SARS-COV-2 COVID-19 AMP PRB: CPT | Performed by: EMERGENCY MEDICINE

## 2021-06-07 PROCEDURE — 81001 URINALYSIS AUTO W/SCOPE: CPT | Performed by: EMERGENCY MEDICINE

## 2021-06-07 PROCEDURE — 250N000013 HC RX MED GY IP 250 OP 250 PS 637: Performed by: OBSTETRICS & GYNECOLOGY

## 2021-06-07 PROCEDURE — 96365 THER/PROPH/DIAG IV INF INIT: CPT

## 2021-06-07 PROCEDURE — 96366 THER/PROPH/DIAG IV INF ADDON: CPT

## 2021-06-07 PROCEDURE — 99207 PR CONSULT E&M CHANGED TO INITIAL LEVEL: CPT | Performed by: PHYSICIAN ASSISTANT

## 2021-06-07 PROCEDURE — 250N000009 HC RX 250: Performed by: PHYSICIAN ASSISTANT

## 2021-06-07 RX ORDER — ALBUTEROL SULFATE 0.83 MG/ML
2.5 SOLUTION RESPIRATORY (INHALATION)
Status: DISCONTINUED | OUTPATIENT
Start: 2021-06-07 | End: 2021-06-09 | Stop reason: HOSPADM

## 2021-06-07 RX ORDER — LABETALOL 200 MG/1
200 TABLET, FILM COATED ORAL EVERY 12 HOURS SCHEDULED
Status: DISCONTINUED | OUTPATIENT
Start: 2021-06-07 | End: 2021-06-09 | Stop reason: HOSPADM

## 2021-06-07 RX ORDER — AZITHROMYCIN 500 MG/1
500 INJECTION, POWDER, LYOPHILIZED, FOR SOLUTION INTRAVENOUS ONCE
Status: COMPLETED | OUTPATIENT
Start: 2021-06-07 | End: 2021-06-07

## 2021-06-07 RX ORDER — PNV NO.95/FERROUS FUM/FOLIC AC 28MG-0.8MG
1 TABLET ORAL DAILY
COMMUNITY

## 2021-06-07 RX ORDER — LABETALOL 200 MG/1
200 TABLET, FILM COATED ORAL 2 TIMES DAILY
COMMUNITY

## 2021-06-07 RX ORDER — POTASSIUM CHLORIDE 1500 MG/1
40 TABLET, EXTENDED RELEASE ORAL ONCE
Status: COMPLETED | OUTPATIENT
Start: 2021-06-07 | End: 2021-06-07

## 2021-06-07 RX ORDER — AZITHROMYCIN 250 MG/1
250 TABLET, FILM COATED ORAL DAILY
Status: DISCONTINUED | OUTPATIENT
Start: 2021-06-08 | End: 2021-06-09 | Stop reason: HOSPADM

## 2021-06-07 RX ORDER — CEFTRIAXONE 2 G/1
2 INJECTION, POWDER, FOR SOLUTION INTRAMUSCULAR; INTRAVENOUS ONCE
Status: COMPLETED | OUTPATIENT
Start: 2021-06-07 | End: 2021-06-07

## 2021-06-07 RX ORDER — CEFTRIAXONE 2 G/1
2 INJECTION, POWDER, FOR SOLUTION INTRAMUSCULAR; INTRAVENOUS EVERY 24 HOURS
Status: DISCONTINUED | OUTPATIENT
Start: 2021-06-08 | End: 2021-06-09 | Stop reason: HOSPADM

## 2021-06-07 RX ORDER — IOPAMIDOL 755 MG/ML
70 INJECTION, SOLUTION INTRAVASCULAR ONCE
Status: COMPLETED | OUTPATIENT
Start: 2021-06-07 | End: 2021-06-07

## 2021-06-07 RX ORDER — LABETALOL HYDROCHLORIDE 5 MG/ML
20 INJECTION, SOLUTION INTRAVENOUS ONCE
Status: COMPLETED | OUTPATIENT
Start: 2021-06-07 | End: 2021-06-07

## 2021-06-07 RX ORDER — ASPIRIN 81 MG/1
81 TABLET, CHEWABLE ORAL DAILY
Status: DISCONTINUED | OUTPATIENT
Start: 2021-06-07 | End: 2021-06-09 | Stop reason: HOSPADM

## 2021-06-07 RX ORDER — ACETAMINOPHEN 325 MG/1
650 TABLET ORAL EVERY 4 HOURS PRN
Status: DISCONTINUED | OUTPATIENT
Start: 2021-06-07 | End: 2021-06-09 | Stop reason: HOSPADM

## 2021-06-07 RX ADMIN — CEFTRIAXONE SODIUM 2 G: 2 INJECTION, POWDER, FOR SOLUTION INTRAMUSCULAR; INTRAVENOUS at 10:09

## 2021-06-07 RX ADMIN — LABETALOL HYDROCHLORIDE 200 MG: 200 TABLET, FILM COATED ORAL at 20:39

## 2021-06-07 RX ADMIN — ACETAMINOPHEN 650 MG: 325 TABLET, FILM COATED ORAL at 20:39

## 2021-06-07 RX ADMIN — LABETALOL HYDROCHLORIDE 200 MG: 200 TABLET, FILM COATED ORAL at 11:37

## 2021-06-07 RX ADMIN — LABETALOL HYDROCHLORIDE 20 MG: 5 INJECTION, SOLUTION INTRAVENOUS at 07:47

## 2021-06-07 RX ADMIN — SODIUM CHLORIDE 94 ML: 9 INJECTION, SOLUTION INTRAVENOUS at 08:49

## 2021-06-07 RX ADMIN — ACETAMINOPHEN 650 MG: 325 TABLET, FILM COATED ORAL at 11:38

## 2021-06-07 RX ADMIN — POTASSIUM CHLORIDE 40 MEQ: 1500 TABLET, EXTENDED RELEASE ORAL at 12:21

## 2021-06-07 RX ADMIN — IOPAMIDOL 70 ML: 755 INJECTION, SOLUTION INTRAVENOUS at 08:48

## 2021-06-07 RX ADMIN — AZITHROMYCIN MONOHYDRATE 500 MG: 500 INJECTION, POWDER, LYOPHILIZED, FOR SOLUTION INTRAVENOUS at 11:31

## 2021-06-07 RX ADMIN — ALBUTEROL SULFATE 2.5 MG: 2.5 SOLUTION RESPIRATORY (INHALATION) at 21:31

## 2021-06-07 ASSESSMENT — ENCOUNTER SYMPTOMS
NAUSEA: 0
DYSURIA: 0
ABDOMINAL PAIN: 0
DIARRHEA: 0
CONSTIPATION: 0
COUGH: 1
BLOOD IN STOOL: 0
HEMATURIA: 0
VOMITING: 0
SHORTNESS OF BREATH: 1
FREQUENCY: 0

## 2021-06-07 ASSESSMENT — ACTIVITIES OF DAILY LIVING (ADL): ADLS_ACUITY_SCORE: 13

## 2021-06-07 NOTE — ED PROVIDER NOTES
History   Chief Complaint:  Cough and congestion     HPI   Ema Khan is a 5 month pregnant 35 year old female with history of asthma and hypertension who presents with cough and congestion. The patient reports having mucous buildup in her chest and cough with associated left sided chest pain for the past 24 hours. She has been producing white mucous with no blood. She does not feel generally sick with these symptoms although she does also endorse shortness of breath while walking and reports a history of asthma which has not been exacerbated for many years. She notes her daughter went to the mall the other day and is now sick at home but she otherwise denies other sick contact. She has been having high blood pressure lately that she attributes to elevated stress and was recently placed on Normodyne. She notes her blood pressure at home is typically 121/115. Here in the ED, her blood pressure is 157/109 which she attributes to not taking her blood pressure medication last night. She follows with her OB regarding her blood pressure issues. She otherwise denies recent travel, blood clot history, and heart history. She also denies nausea, emesis, urine/bowel symptoms, abdominal pain, and vaginal issues.     Review of Systems   HENT: Positive for congestion.    Respiratory: Positive for cough and shortness of breath.    Cardiovascular: Positive for chest pain.   Gastrointestinal: Negative for abdominal pain, blood in stool, constipation, diarrhea, nausea and vomiting.   Genitourinary: Negative for dysuria, frequency, hematuria, urgency, vaginal bleeding, vaginal discharge and vaginal pain.   All other systems reviewed and are negative.      Allergies:  Amoxicillin  Penicillins    Medications:  Normodyne    Past Medical History:    Hypertension  Multigravida  Hypertension  Diabetes mellitus type II  Obesity  THC use disorder  Asthma  homicidal ideation    Past Surgical History:    Conization cerv w/wo D&C  Knife/Laser  Suction D&C (retained POC after miscarriage)    Family History:    Hypertension  Asthma    Social History:  The patient presents with mother.   The patient is currently 5 months pregnant.  Physical Exam     Patient Vitals for the past 24 hrs:   BP Temp Temp src Pulse Resp SpO2   06/07/21 0830 (!) 142/98 -- -- 109 -- 98 %   06/07/21 0815 (!) 147/97 -- -- 106 -- 98 %   06/07/21 0800 (!) 139/115 -- -- 109 -- 98 %   06/07/21 0755 (!) 147/93 -- -- 104 -- 99 %   06/07/21 0700 (!) 157/109 -- -- 124 -- --   06/07/21 0617 (!) 155/101 98.3  F (36.8  C) Oral 121 22 98 %       Physical Exam  SKIN:  Warm, dry.  HEMATOLOGIC/IMMUNOLOGIC/LYMPHATIC:  No pallor.  No edema.  HENT: No JVD.  EYES:  Conjunctivae normal.  Normal extraocular motion.  Pupils equal round and reactive to light.  Visual fields intact.  CARDIOVASCULAR: Tachycardic rate with regular rhythm.  No murmur.  No rub.  RESPIRATORY:  No respiratory distress, breath sounds equal and normal.  GASTROINTESTINAL:  Nontender gravid abdomen.  MUSCULOSKELETAL: Normal body habitus.  NEUROLOGIC:  Alert, conversant.  Oriented to self place and time.  No aphasia or dysarthria.  No gross motor deficit.  No gross tactile sensory deficit.  PSYCHIATRIC:  Normal mood.    Emergency Department Course   ECG (07:17:51):  Rate 112 bpm. OR interval 154. QRS duration 66. QT/QTc 340/464. P-R-T axes 44 19 1. Sinus tachycardia. Nonspecific T wave abnormality. Abnormal EKG. No significant change compared to EKG on 12/30/2017. Interpreted at 0720 by Stanislaw Lepe MD.    Imaging:    CT-scan Chest pulmonary embolism w/ contrast:  1.  No pulmonary embolism demonstrated.   2.  Left lower lobe pneumonia.   3.  Soft tissue density with convex borders in the anterior   mediastinum. Differential includes thymic hyperplasia and thymoma.   4.  1.9 cm rounded density in the upper outer right breast is enlarged   since the comparison study, I favor benign etiology but diagnostic   mammography  or breast ultrasound is recommended for further   evaluation.   Result per radiology     Laboratory:    CBC: WBC: 20.9 (H), HGB: 11.8, PLT: 263  CMP: Glucose 107 (H), Potassium: 3.3 (L), Urea Nitrogen: 4 (L), Creatinine: 0.43 (L), Albumin: 2.7 (L), o/w WNL  UA: Ketones: 20, Protein Albumin: 10, Squamous Epithelial: 9 (H), Mucous: Present, o/w Negative  Troponin (Collected 0710): <0.015  D-dimer: 0.7  Bilirubin Direct: <0.1  Symptomatic Influenza A/B & SARS-CoV2 (COVID19) Virus PCR Multiplex: Negative     Emergency Department Course:    Reviewed:    I reviewed the patient's nursing notes, vitals, past medical records, Care Everywhere.     Assessments:    0715: I performed an exam of the patient and obtained history, as documented above.    0929: I rechecked the patient and updated them on findings. They are amenable to admission.     Consults:   0935: I spoke with OB/GYN Dr. Ramirez regarding the patient. They accept for admission.     Interventions:  0747: Labetalol 20 mg IV  1009: Rocephin 2 g in 100 mL IV infusion    Disposition:  The patient was admitted to the hospital under the care of Dr. Ramirez.       Impression & Plan   Medical Decision Making:  This patient presents symptoms suggesting pneumonia but given a second trimester pregnancy with dyspnea, chest pain, and tachycardia pulmonary embolism was a concern as well. D Dimer was slightly elevated which prompted CT chest revealing left lower pneumonia which would correlate with her symptoms/ presentation. Community acquired antibiotics were initiated. Also concerning was hypertension in the context of second trimester pregnancy. Blood pressure improved after a dose of labetalol. Testing with respect to pregnancy related hypertension was reassuring. Patient consented to admission for treatment and further monitoring.    Covid-19  Ema Khan was evaluated during a global COVID-19 pandemic, which necessitated consideration that the patient might be at  risk for infection with the SARS-CoV-2 virus that causes COVID-19.   Applicable protocols for evaluation were followed during the patient's care.   COVID-19 was considered as part of the patient's evaluation. The plan for testing is:  a test was obtained during this visit.    Diagnosis:    ICD-10-CM    1. Pneumonia of left lung due to infectious organism, unspecified part of lung  J18.9 UA with Microscopic reflex to Culture   2. Pregnancy-induced hypertension in second trimester  O13.2      Scribe Disclosure:  Tala SOFIA, am serving as a scribe at 7:19 AM on 6/7/2021 to document services personally performed by Stanislaw Lepe MD based on my observations and the provider's statements to me.         Stanislaw Lepe MD  06/07/21 1533

## 2021-06-07 NOTE — LETTER
[place] [id number] MEDICAL SPECIALTY UNIT  [address] [facility] [address]  [phone]          June 9, 2021    RE:  Ema Khan                                                                                                                                                       [address]  [facility] [address]    To whom it may concern:    Ema Khan was admitted to the hospital under my professional care and is now released from the hospital . She is ok to return to work without restriction on Friday June 11 th.Please don't hesitate to contact me if I can be of any further assistance.    Sincerely,        Dorota Schroeder MD,[facility] Medicine

## 2021-06-07 NOTE — CONSULTS
[facility]  Consult Note - Hospitalist Service     Date of Admission:  2021  Consult Requested by:OB-GYN  Reason for Consult: Community Acquired Pneumonia     Assessment & Plan   Ema Khan is a 35 year old female admitted on 2021. She is a  35-year-old female currently 20 weeks gestation with past medical history of chronic hypertension.  She presented to the emergency department for evaluation of shortness of breath.  She was found to be tachycardic with leukocytosis.  CT PE negative for pulmonary embolism but noted left lower lobe infiltrate.  Covid swab negative.  She is being admitted in the care of obstetrics and hospitalist consulted for medical management of community-acquired pneumonia.    Community-acquired left lower lobe pneumonia  History of mild intermittent asthma: Notes previous history of asthma though typically does not require albuterol.  URI going around kids at home.  Notes 24 hours of increasing shortness of breath and productive cough.  Vitals in the emergency department with hypertension, tachycardia and O2 saturation stable on room air.  Labs with WBC of 20.  CT with left lower lobe infiltrate. COVID negative.  Started on ceftriaxone and azithromycin.  -Continue ceftriaxone and azithromycin, notes allergy to amoxicillin as a child.  Okay to continue ceftriaxone  -Albuterol nebulizer available as needed for shortness of breath.  No active wheezing on exam.  No signs of acute asthma exacerbation  -Repeat CBC in the morning  -Encourage IS    Mild Hypokalemia  - Replace in ER  - Repeat BMP in AM    Chronic hypertension.  History of chronic hypertension though not historically on meds.  Started on labetalol at initial prenatal visit 2021.  Notes she was not taking her medications the past 2-days due to feeling poorly.  -We will defer management to primary obstetric service.  Suspect continuation of PTA labetalol    Intrauterine pregnancy at 20  weeks gestation  -Management per OB       The patient's care was discussed with the Patient and ER attending.    Federica Terry PA-C  [facility]  Contact information available via [facility] Paging/Directory    Patient seen and examined.  Agree with impression and plan.     Troy Colvin MD  Pager: [phone]  Cell Phone:  [phone]      ______________________________________________________________________    Chief Complaint   SOB    History is obtained from the patient    History of Present Illness   Ema Khan is a  35 year old female with a past medical history of chronic hypertension who presented to the emergency department with complaints of shortness of breath.  She has a past medical history of asthma but typically does not require need for inhaler.  She notes 2 of her children are sick with similar URI symptoms at home.  She notes 24 hours of increasing rhinorrhea and productive cough.  She has had increasing shortness of breath as well and noted some chest pain.  She elected to present to the emergency department for further evaluation.    In the emergency department she was noted to be hypertensive and tachycardic.  Laboratory evaluation revealed an elevated D-dimer and WBC of 20.  CT PE protocol was obtained which is negative for pulmonary embolism but notable for left lower lobe pneumonia.  She was initiated on ceftriaxone and azithromycin.  Obstetrics is admitting the patient to the hospital service has been asked to assist with management of community-acquired pneumonia    Presently, she is evaluated in the emergency department.  She complains of fatigue and ongoing shortness of breath though vitals remained stable.  She does not have albuterol at home.  She denies fevers or chills.  No nausea or vomiting.  Notes good fetal movement.  Has not been vaccinated for Covid.  Has not taken her labetalol the past couple of days due to feeling  poorly.    Review of Systems   The 10 point Review of Systems is negative other than noted in the HPI or here.     Past Medical History    I have reviewed this patient's medical history and updated it with pertinent information if needed.   Past Medical History:   Diagnosis Date     Hypertension        Past Surgical History   I have reviewed this patient's surgical history and updated it with pertinent information if needed.  No past surgical history on file.    Social History   I have reviewed this patient's social history and updated it with pertinent information if needed.  Social History     Tobacco Use     Smoking status: Former Smoker     Years: 15.00     Smokeless tobacco: Former User     Quit date: 1/8/2012   Substance Use Topics     Alcohol use: No     Drug use: No       Family History     Mother had HTN    Medications   I have reviewed this patient's current medications    Allergies   Allergies   Allergen Reactions     Amoxicillin      Penicillins        Physical Exam   Vital Signs: Temp: 98.3  F (36.8  C) Temp src: Oral BP: (!) 142/98 Pulse: 109   Resp: 22 SpO2: 98 % O2 Device: None (Room air)    Weight: 0 lbs 0 oz    Constitutional: Alert, resting comfortably in NAD  HEENT: Head normocephalic, atraumatic. Eyes sclera non icteric. Oropharynx clear and most  Respiratory: Normal effort, symmetric expansion, no crackles or wheezing  Cardiovascular: RRR no murmurs   GI: Gravid abdomen.   MSK: LE without edema. Dorsalis pedis pulse palpated bilaterally.   Skin/Integumen: Clear  Neuro: CN II-XII grossly intact  Psych:  Alert and oriented x 3. Normal affect      Data   Most Recent 3 CBC's:  Recent Labs   Lab Test 06/07/21  0710 12/30/17  1412 12/11/15  0204   WBC 20.9* 12.4* 5.6   HGB 11.8 11.3* 13.4   MCV 82 82 79    308 213     Most Recent 3 BMP's:  Recent Labs   Lab Test 06/07/21  0710 12/30/17  1412 12/11/15  0204    136 135   POTASSIUM 3.3* 3.2* 3.7   CHLORIDE 104 104 103   CO2 21 23 21   BUN  4* 5* 12   CR 0.43* 0.54 0.76   ANIONGAP 8 9 11   ALVARO 8.8 8.4* 8.6   * 184* 86
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
## 2021-06-07 NOTE — PROGRESS NOTES
RECEIVING UNIT ED HANDOFF REVIEW    ED Nurse Handoff Report was reviewed by: Bryanna Lea RN on June 7, 2021 at 4:56 PM

## 2021-06-07 NOTE — H&P
2021        HPI: 34yo  @ 20.6 wga by 1st tri US with an EDC of 10/19/21, PMH cHTN, asthma, admitted for pneumonia. She presented to the ER with productive cough, SOB, and congestion.  In the ER, D-dimer was checked and was found to be elevated, with subsequent CT showing left lower lobe PNA, no PE.  WBC was found to be elevated at 20.9, and SARS-CoV2 testing neg.  She has a history of chronic hypertension that has complicated all 4 of her previous pregnancies.  She was late to care this pregnancy, and had her NOB visit on  (receives PNC with [facility], Dr. Jackie Alcala) . Her EDC of 10/19/21 is based upon a 9 wk US. She was prescribed labetalol 200 mg BID.  The pt states that in the past they tried a higher dose but she had symptoms and so was decreased back to 200 mg BID.   In the ED her BPs were noted to be elevated in the 150s/100s, and she was given IV labetalol x 1 dose.  She also received her normal PO dose ~ 1 hour ago.  PNLs in Epic: O pos, ABS neg, RI, HIV neg, HBsAg neg, Hep C neg, GC/CT neg.  RPR? Not found. HA1c 5.2%    POBGYN:  06: 36 wk VD, c/b PIH, 5 lb 4 oz, clavicle fracture  3/18/10: 37 wk VD, c/b PIH, 6 lb  18: 37.1 wk VD, 5 lb 9 oz, c/b PIH  19: 1st tri missed AB s/p suction D&C for RPOC  20: 37.1 wk VD, c/b PIH, IUGR, 4 lb 15 oz      PMH: cHTN, breast lump, non-immune to Hep B, THC use, asthma    PSH: suction D&C (2019), [facility]     SH: +marijuana, denies EtOH, denies tobacco use    ALL: PCNs    PEX: /66   Pulse 110   Temp 98.3  F (36.8  C) (Oral)   Resp 22   SpO2 98%   Gen: NAD, A&O x 3, appears mildly SOB at rest  Abd: gravid, NT  : deferred  Ext: no CCE, no CT    Bedside US: viable IUP with FHR 140s, JAZZ appears subjectively normal, Anterior placenta, no previa, +FM    Lab Results   Component Value Date    WBC 20.9 2021     Lab Results   Component Value Date    RBC 4.40 2021     Lab Results   Component Value Date    HGB 11.8  06/07/2021     Lab Results   Component Value Date    HCT 36.2 06/07/2021     No components found for: MCT  Lab Results   Component Value Date    MCV 82 06/07/2021     Lab Results   Component Value Date    MCH 26.8 06/07/2021     Lab Results   Component Value Date    MCHC 32.6 06/07/2021     Lab Results   Component Value Date    RDW 13.7 06/07/2021     Lab Results   Component Value Date     06/07/2021     Last Comprehensive Metabolic Panel:  Sodium   Date Value Ref Range Status   06/07/2021 133 133 - 144 mmol/L Final     Potassium   Date Value Ref Range Status   06/07/2021 3.3 (L) 3.4 - 5.3 mmol/L Final     Chloride   Date Value Ref Range Status   06/07/2021 104 94 - 109 mmol/L Final     Carbon Dioxide   Date Value Ref Range Status   06/07/2021 21 20 - 32 mmol/L Final     Anion Gap   Date Value Ref Range Status   06/07/2021 8 3 - 14 mmol/L Final     Glucose   Date Value Ref Range Status   06/07/2021 107 (H) 70 - 99 mg/dL Final     Urea Nitrogen   Date Value Ref Range Status   06/07/2021 4 (L) 7 - 30 mg/dL Final     Creatinine   Date Value Ref Range Status   06/07/2021 0.43 (L) 0.52 - 1.04 mg/dL Final     GFR Estimate   Date Value Ref Range Status   06/07/2021 >90 >60 mL/min/[1.73_m2] Final     Comment:     Non  GFR Calc  Starting 12/18/2018, serum creatinine based estimated GFR (eGFR) will be   calculated using the Chronic Kidney Disease Epidemiology Collaboration   (CKD-EPI) equation.       Calcium   Date Value Ref Range Status   06/07/2021 8.8 8.5 - 10.1 mg/dL Final     Bilirubin Total   Date Value Ref Range Status   06/07/2021 0.4 0.2 - 1.3 mg/dL Final     Alkaline Phosphatase   Date Value Ref Range Status   06/07/2021 104 40 - 150 U/L Final     ALT   Date Value Ref Range Status   06/07/2021 14 0 - 50 U/L Final     AST   Date Value Ref Range Status   06/07/2021 14 0 - 45 U/L Final     COVID-19 Antibody Results, Testing for Immunity    COVID-19 Antibody Results, Testing for Immunity   No data  to display.         COVID-19 PCR Results    COVID-19 PCR Results 21   SARS-CoV-2 Virus Specimen Source Nasopharyngeal   SARS-CoV-2 PCR Result NEGATIVE      Comments are available for some flowsheets but are not being displayed.             A/P: 34yo  @ 20.6 wga by 1st tri US (EDC 10/19/21) admitted for LLL pneumonia.  Afebrile, VSS.   - PNA: management per hospitalist team, continue current antibiotic regimen  - COVID-19 neg  - Bedside US reassuring, plan for fetal dopplers Q shift.  Pt has a level 2 US scheduled with [facility] in 2 weeks.  - cHTN: continue labetalol 200 mg PO BID      JAK ODDSON MD

## 2021-06-07 NOTE — PHARMACY-ADMISSION MEDICATION HISTORY
Pharmacy Medication History  Admission medication history interview status for the 6/7/2021  admission is complete. See EPIC admission navigator for prior to admission medications     Location of Interview: Patient room  Medication history sources: Patient    Significant changes made to the medication list:  Added both medications.    In the past week, patient estimated taking medication this percent of the time: greater than 90%    Additional medication history information:   none    Medication reconciliation completed by provider prior to medication history? No    Time spent in this activity: 15 minutes    Prior to Admission medications    Medication Sig Last Dose Taking? Auth Provider   labetalol (NORMODYNE) 200 MG tablet Take 200 mg by mouth 2 times daily 6/6/2021 at Unknown time Yes Unknown, Entered By History   Prenatal Vit-Fe Fumarate-FA (PRENATAL VITAMIN) 27-0.8 MG TABS Take 1 tablet by mouth daily 6/6/2021 at Unknown time Yes Unknown, Entered By History       The information provided in this note is only as accurate as the sources available at the time of update(s)

## 2021-06-07 NOTE — ED TRIAGE NOTES
Pt c/o coughing up white mucous and nasal congestion. 5 months pregnant and she reports every pregnancy this happens around this time.

## 2021-06-07 NOTE — PLAN OF CARE
Ema is a  @ 20w6d here with pneumonia. Was asked to do doptones. FHTs 146-155. +FM. Denies cramping, LOF, bleeding. Pt resting comfortably.

## 2021-06-08 LAB
BASOPHILS # BLD AUTO: 0.1 10E9/L (ref 0–0.2)
BASOPHILS NFR BLD AUTO: 0.4 %
DIFFERENTIAL METHOD BLD: ABNORMAL
EOSINOPHIL # BLD AUTO: 0.2 10E9/L (ref 0–0.7)
EOSINOPHIL NFR BLD AUTO: 1.5 %
ERYTHROCYTE [DISTWIDTH] IN BLOOD BY AUTOMATED COUNT: 14 % (ref 10–15)
HCT VFR BLD AUTO: 34.2 % (ref 35–47)
HGB BLD-MCNC: 11 G/DL (ref 11.7–15.7)
IMM GRANULOCYTES # BLD: 0.1 10E9/L (ref 0–0.4)
IMM GRANULOCYTES NFR BLD: 0.7 %
LYMPHOCYTES # BLD AUTO: 2 10E9/L (ref 0.8–5.3)
LYMPHOCYTES NFR BLD AUTO: 14.7 %
MCH RBC QN AUTO: 26.8 PG (ref 26.5–33)
MCHC RBC AUTO-ENTMCNC: 32.2 G/DL (ref 31.5–36.5)
MCV RBC AUTO: 83 FL (ref 78–100)
MONOCYTES # BLD AUTO: 0.9 10E9/L (ref 0–1.3)
MONOCYTES NFR BLD AUTO: 6.8 %
NEUTROPHILS # BLD AUTO: 10.3 10E9/L (ref 1.6–8.3)
NEUTROPHILS NFR BLD AUTO: 75.9 %
NRBC # BLD AUTO: 0 10*3/UL
NRBC BLD AUTO-RTO: 0 /100
PLATELET # BLD AUTO: 267 10E9/L (ref 150–450)
POTASSIUM SERPL-SCNC: 3.3 MMOL/L (ref 3.4–5.3)
POTASSIUM SERPL-SCNC: 3.7 MMOL/L (ref 3.4–5.3)
RBC # BLD AUTO: 4.11 10E12/L (ref 3.8–5.2)
WBC # BLD AUTO: 13.6 10E9/L (ref 4–11)

## 2021-06-08 PROCEDURE — 250N000009 HC RX 250: Performed by: PHYSICIAN ASSISTANT

## 2021-06-08 PROCEDURE — 120N000001 HC R&B MED SURG/OB

## 2021-06-08 PROCEDURE — 36415 COLL VENOUS BLD VENIPUNCTURE: CPT | Performed by: PHYSICIAN ASSISTANT

## 2021-06-08 PROCEDURE — 250N000013 HC RX MED GY IP 250 OP 250 PS 637: Performed by: INTERNAL MEDICINE

## 2021-06-08 PROCEDURE — 84132 ASSAY OF SERUM POTASSIUM: CPT | Performed by: PHYSICIAN ASSISTANT

## 2021-06-08 PROCEDURE — 250N000013 HC RX MED GY IP 250 OP 250 PS 637: Performed by: PHYSICIAN ASSISTANT

## 2021-06-08 PROCEDURE — 84132 ASSAY OF SERUM POTASSIUM: CPT | Performed by: INTERNAL MEDICINE

## 2021-06-08 PROCEDURE — 85025 COMPLETE CBC W/AUTO DIFF WBC: CPT | Performed by: PHYSICIAN ASSISTANT

## 2021-06-08 PROCEDURE — 250N000013 HC RX MED GY IP 250 OP 250 PS 637: Performed by: OBSTETRICS & GYNECOLOGY

## 2021-06-08 PROCEDURE — 999N000157 HC STATISTIC RCP TIME EA 10 MIN

## 2021-06-08 PROCEDURE — 250N000011 HC RX IP 250 OP 636: Performed by: PHYSICIAN ASSISTANT

## 2021-06-08 PROCEDURE — 99232 SBSQ HOSP IP/OBS MODERATE 35: CPT | Performed by: INTERNAL MEDICINE

## 2021-06-08 PROCEDURE — 94640 AIRWAY INHALATION TREATMENT: CPT

## 2021-06-08 PROCEDURE — 36415 COLL VENOUS BLD VENIPUNCTURE: CPT | Performed by: INTERNAL MEDICINE

## 2021-06-08 RX ORDER — PNV NO.95/FERROUS FUM/FOLIC AC 28MG-0.8MG
1 TABLET ORAL DAILY
Status: DISCONTINUED | OUTPATIENT
Start: 2021-06-08 | End: 2021-06-08

## 2021-06-08 RX ORDER — POTASSIUM CHLORIDE 1.5 G/1.58G
40 POWDER, FOR SOLUTION ORAL ONCE
Status: COMPLETED | OUTPATIENT
Start: 2021-06-08 | End: 2021-06-08

## 2021-06-08 RX ORDER — PRENATAL VIT/IRON FUM/FOLIC AC 27MG-0.8MG
1 TABLET ORAL DAILY
Status: DISCONTINUED | OUTPATIENT
Start: 2021-06-08 | End: 2021-06-09 | Stop reason: HOSPADM

## 2021-06-08 RX ADMIN — ALBUTEROL SULFATE 2.5 MG: 2.5 SOLUTION RESPIRATORY (INHALATION) at 07:05

## 2021-06-08 RX ADMIN — ALBUTEROL SULFATE 2.5 MG: 2.5 SOLUTION RESPIRATORY (INHALATION) at 14:49

## 2021-06-08 RX ADMIN — ASPIRIN 81 MG CHEWABLE TABLET 81 MG: 81 TABLET CHEWABLE at 08:08

## 2021-06-08 RX ADMIN — PRENATAL VITAMINS-IRON FUMARATE 27 MG IRON-FOLIC ACID 0.8 MG TABLET 1 TABLET: at 11:49

## 2021-06-08 RX ADMIN — AZITHROMYCIN MONOHYDRATE 250 MG: 250 TABLET ORAL at 08:08

## 2021-06-08 RX ADMIN — POTASSIUM CHLORIDE 40 MEQ: 1.5 POWDER, FOR SOLUTION ORAL at 10:07

## 2021-06-08 RX ADMIN — CEFTRIAXONE SODIUM 2 G: 2 INJECTION, POWDER, FOR SOLUTION INTRAMUSCULAR; INTRAVENOUS at 10:07

## 2021-06-08 RX ADMIN — LABETALOL HYDROCHLORIDE 200 MG: 200 TABLET, FILM COATED ORAL at 08:08

## 2021-06-08 RX ADMIN — LABETALOL HYDROCHLORIDE 200 MG: 200 TABLET, FILM COATED ORAL at 20:50

## 2021-06-08 RX ADMIN — ACETAMINOPHEN 650 MG: 325 TABLET, FILM COATED ORAL at 08:08

## 2021-06-08 ASSESSMENT — ACTIVITIES OF DAILY LIVING (ADL)
ADLS_ACUITY_SCORE: 11

## 2021-06-08 ASSESSMENT — MIFFLIN-ST. JEOR: SCORE: 1561.79

## 2021-06-08 NOTE — PROGRESS NOTES
[facility]    HOSPITALIST PROGRESS NOTE :   --------------------------------------------------    Date of Admission:  2021    Cumulative Summary: Ema Khan is a 35 year old female admitted on 2021. She is a  35-year-old female currently 20 weeks gestation with past medical history of chronic hypertension.  She presented to the emergency department for evaluation of shortness of breath.  She was found to be tachycardic with leukocytosis.  CT PE negative for pulmonary embolism but noted left lower lobe infiltrate.  Covid swab negative.  She is being admitted in the care of obstetrics and hospitalist consulted for medical management of community-acquired pneumonia.     Assessment & Plan     Community-acquired left lower lobe pneumonia  History of mild intermittent asthma:   Notes previous history of asthma though typically does not require albuterol.  URI going around kids at home.  Notes 24 hours of increasing shortness of breath and productive cough.  Vitals in the emergency department with hypertension, tachycardia and O2 saturation stable on room air.  Labs with WBC of 20.  CT with left lower lobe infiltrate. COVID negative.  Started on ceftriaxone and azithromycin.    -- Patient care was assumed this morning , seen and examined, significantly SOB on minimal exertion , has to stop between talking to catch breath but has been maintaining her pulse oximetry  -- Continue ceftriaxone and azithromycin, notes allergy to amoxicillin as a child.    --Albuterol nebulizer available as needed for shortness of breath.  No active wheezing on exam.  No signs of acute asthma exacerbation  -- Repeated CBC from this morning showing improvement in WBC counts   -- Will order incentive spirometry      Mild Hypokalemia  -- Replace in ER  -- Repeated BMP this morning , improved      Chronic hypertension.  History of chronic hypertension though not historically on meds.  Started on labetalol  at initial prenatal visit 5/27/2021.  Notes she was not taking her medications the past 2-days due to feeling poorly.    --  primary obstetric service.  PTA labetalol is adjusted and BP is well controlled at this point      Intrauterine pregnancy at 20 weeks gestation  -- Management per OB     Diet: Regular Diet Adult    Bingham Catheter: not present  DVT Prophylaxis: Defer to primary service  Code Status: Full Code    The patient's care was discussed with the Bedside Nurse and Patient.    Disposition Plan   Expected discharge: Tomorrow, recommended to prior living arrangement   If continues to make clinical improvement     Dorota Schroeder MD, FACP  Text Page (7am - 6pm)    ----------------------------------------------------------------------------------------------------------------------    Interval History   Patient care was assumed this morning , seen and examined , sitting in bed , seemed to be getting SOB easily on any exertion, and during conversation, does not think that she is worsening , thinks that she might be ready to be discharged tomorrow morning     -Data reviewed today: I reviewed all new labs and imaging results over the last 24 hours.    I personally reviewed no images or EKG's today.    Physical Exam   Temp: 97.7  F (36.5  C) Temp src: Oral BP: 120/76 Pulse: 100   Resp: 18 SpO2: 98 % O2 Device: None (Room air)    Vitals:    06/08/21 0949   Weight: 87.1 kg (192 lb)     Vital Signs with Ranges  Temp:  [97.3  F (36.3  C)-97.7  F (36.5  C)] 97.7  F (36.5  C)  Pulse:  [] 100  Resp:  [18] 18  BP: (105-160)/() 120/76  SpO2:  [98 %-99 %] 98 %  No intake/output data recorded.    GENERAL: Alert , awake and oriented. NAD. Conversational, appropriate.   HEENT: Normocephalic. EOMI. No icterus or injection. Nares normal.   LUNGS: Clear to auscultation Few rhonchi in bases , no wheezing   HEART: Regular rate. Extremities perfused.   ABDOMEN: Soft, nontender, and nondistended. Positive bowel sounds.    EXTREMITIES: No LE edema noted.   NEUROLOGIC: Moves extremities x4 on command. No acute focal neurologic abnormalities noted.     Medications       aspirin  81 mg Oral Daily     azithromycin  250 mg Oral Daily     cefTRIAXone  2 g Intravenous Q24H     labetalol  200 mg Oral Q12H VICKY     prenatal multivitamin w/iron  1 tablet Oral Daily       Data   Recent Labs   Lab 06/08/21  0800 06/07/21  0710   WBC 13.6* 20.9*   HGB 11.0* 11.8   MCV 83 82    263   NA  --  133   POTASSIUM 3.3* 3.3*   CHLORIDE  --  104   CO2  --  21   BUN  --  4*   CR  --  0.43*   ANIONGAP  --  8   ALVARO  --  8.8   GLC  --  107*   ALBUMIN  --  2.7*   PROTTOTAL  --  7.2   BILITOTAL  --  0.4   ALKPHOS  --  104   ALT  --  14   AST  --  14   TROPI  --  <0.015       Imaging:   No results found for this or any previous visit (from the past 24 hour(s)).

## 2021-06-08 NOTE — PROVIDER NOTIFICATION
MD Notification    Notified Person: MD    Notified Person Name: Alexandre     Notification Date/Time: 06/08/21 7:37 AM    Notification Interaction: Text Page    Purpose of Notification: Pt does not have code status can we please get one?    Orders Received:    Comments:

## 2021-06-08 NOTE — UTILIZATION REVIEW
Admission Status; Secondary Review Determination       Under the authority of the Utilization Management Committee, the utilization review process indicated a secondary review on the above patient. The review outcome is based on review of the medical records, discussions with staff, and applying clinical experience noted on the date of the review.     (x) Inpatient Status Appropriate - This patient's medical care is consistent with medical management for inpatient care and reasonable inpatient medical practice.     RATIONALE FOR DETERMINATION   36yo  @ 20.6 wga by 1st [facility] US with an EDC of 10/19/21, PMH cHTN, asthma, admitted for pneumonia. She presented to the ER with productive cough, SOB, and congestion.  In the ER, D-dimer was checked and was found to be elevated, with subsequent CT showing left lower lobe PNA.  Multiple factors increases patient's risk of adverse outcome and requires close monitoring and management in the hospital setting including significant hypertension in a pregnant woman with a history of asthma, pneumonia now and has significant leukocytosis of 20,000 white count and tachycardia.  The expected length of stay at the time of admission was more than 2 nights because of the severity of illness, intensity of service provided, and risk for adverse outcome. Inpatient admission is appropriate.     This document was produced using voice recognition software       The information on this document is developed by the utilization review team in order for the business office to ensure compliance. This only denotes the appropriateness of proper admission status and does not reflect the quality of care rendered.   The definitions of Inpatient Status and Observation Status used in making the determination above are those provided in the CMS Coverage Manual, Chapter 1 and Chapter 6, section 70.4.   Sincerely,   CK LIAO MD   System Medical Director   Utilization Management   [facility].

## 2021-06-08 NOTE — PLAN OF CARE
DATE & TIME: 06/8/21 0700-1930   Cognitive Concerns/ Orientation :A&O x4  BEHAVIOR & AGGRESSION TOOL COLOR: Green  CIWA SCORE: NA   ABNL VS/O2: VSS RA  MOBILITY: IND  PAIN MANAGMENT: Denies  DIET: Regular  BOWEL/BLADDER: incontinent   ABNL LAB/BG: WBC: 13.6, K+ recheck 3.3 protocol ordered recheck art 1400, D-dimer 0.7  DRAIN/DEVICES: PIV SL  TELEMETRY RHYTHM: NA  SKIN: WDL  TESTS/PROCEDURES:   D/C DATE: pending per MD maybe tomorrow after IV antibiotics    OTHER IMPORTANT INFO: Pt is 20W6D pregnant, fetal monitoring every shift done by OB, PRUETT pt will ask if neb needed

## 2021-06-08 NOTE — PLAN OF CARE
Ema is a 21week  patient with pneumonia. Was asked to do Doptones. Fht's in 140's. +FM, Denies cramping, bleeding or loss of fluid. Pt resting comfortably.

## 2021-06-08 NOTE — PLAN OF CARE
DATE & TIME: 06/8/21 9062-7315    Cognitive Concerns/ Orientation :A&O x4  BEHAVIOR & AGGRESSION TOOL COLOR: Green  CIWA SCORE: NA   ABNL VS/O2: VSS RA  MOBILITY: IND  PAIN MANAGMENT: Denies  DIET: Regular  BOWEL/BLADDER: incontinent   ABNL LAB/BG: wbc 20.9, k+ 3.3 replaced in the ED, recheck this morning, D-dimer 0.7  DRAIN/DEVICES: PIV SL  TELEMETRY RHYTHM: NA  SKIN: WDL  TESTS/PROCEDURES:   D/C DATE: pending  OTHER IMPORTANT INFO: pt is 6 weeks pregnant, fetal monitoring every shift, PRUETT pt will ask if neb needed  MD/RN ROUNDING SIGNED OFF D/E SHIFT:  COMMIT TO SIT DONE AND SIGNED OFF yes

## 2021-06-08 NOTE — PROGRESS NOTES
OB  Notes she still has SOB, particularly after ambulating to bathroom. Notes good FM, denies ctx.    /76 (BP Location: Right arm)   Pulse 100   Temp 97.7  F (36.5  C) (Oral)   Resp 18   SpO2 98%    NAD, A&Ox3  Abd Soft, ND    WBC 13.6 (appropriate for pregnancy) <--20.9    A/P: HD2 35 year old  @ 21.0 with community acquired pneumonia.  - Medical mgmt per hospitalist  - chronic HTN now well managed on labetalol 200 mg po bid  - Pt will f/u with [facility] US as outpatient in 2 weeks  - tid fetal dopplers    Mery Calloway MD

## 2021-06-08 NOTE — PROVIDER NOTIFICATION
MD Notification    Notified Person: MD    Notified Person Name: Alexandre    Notification Date/Time: 06/08/21 9:44 AM    Notification Interaction: Text Page    Purpose of Notification: K 3.3, no protocol     Orders Received:    Comments:

## 2021-06-08 NOTE — PLAN OF CARE
Pt is independent in the room, VSS on RA - breathing is labored but O2 is 98+, Pt is pregnant (20w6d). Complains of lung pain on the right side, given Tylenol x1. Requested RT to help w/ Albuterol neb. Skin intact. Fetal heart monitoring to be done once per shift. Dry/nonproductive cough.

## 2021-06-09 VITALS
SYSTOLIC BLOOD PRESSURE: 138 MMHG | RESPIRATION RATE: 16 BRPM | DIASTOLIC BLOOD PRESSURE: 96 MMHG | WEIGHT: 192 LBS | TEMPERATURE: 97.8 F | HEIGHT: 65 IN | BODY MASS INDEX: 31.99 KG/M2 | HEART RATE: 92 BPM | OXYGEN SATURATION: 99 %

## 2021-06-09 LAB
POTASSIUM SERPL-SCNC: 3.3 MMOL/L (ref 3.4–5.3)
WBC # BLD AUTO: 10.6 10E9/L (ref 4–11)

## 2021-06-09 PROCEDURE — 99238 HOSP IP/OBS DSCHRG MGMT 30/<: CPT | Performed by: INTERNAL MEDICINE

## 2021-06-09 PROCEDURE — 250N000013 HC RX MED GY IP 250 OP 250 PS 637: Performed by: PHYSICIAN ASSISTANT

## 2021-06-09 PROCEDURE — 84132 ASSAY OF SERUM POTASSIUM: CPT | Performed by: INTERNAL MEDICINE

## 2021-06-09 PROCEDURE — 250N000013 HC RX MED GY IP 250 OP 250 PS 637: Performed by: OBSTETRICS & GYNECOLOGY

## 2021-06-09 PROCEDURE — 85048 AUTOMATED LEUKOCYTE COUNT: CPT | Performed by: INTERNAL MEDICINE

## 2021-06-09 PROCEDURE — 250N000013 HC RX MED GY IP 250 OP 250 PS 637: Performed by: INTERNAL MEDICINE

## 2021-06-09 PROCEDURE — 250N000011 HC RX IP 250 OP 636: Performed by: PHYSICIAN ASSISTANT

## 2021-06-09 PROCEDURE — 36415 COLL VENOUS BLD VENIPUNCTURE: CPT | Performed by: INTERNAL MEDICINE

## 2021-06-09 RX ORDER — ALBUTEROL SULFATE 90 UG/1
2 AEROSOL, METERED RESPIRATORY (INHALATION) EVERY 4 HOURS PRN
Qty: 8 G | Refills: 0 | Status: SHIPPED | OUTPATIENT
Start: 2021-06-09

## 2021-06-09 RX ORDER — LEVOFLOXACIN 750 MG/1
750 TABLET, FILM COATED ORAL DAILY
Qty: 7 TABLET | Refills: 0 | Status: SHIPPED | OUTPATIENT
Start: 2021-06-09 | End: 2021-06-16

## 2021-06-09 RX ORDER — POTASSIUM CHLORIDE 1.5 G/1.58G
40 POWDER, FOR SOLUTION ORAL ONCE
Status: COMPLETED | OUTPATIENT
Start: 2021-06-09 | End: 2021-06-09

## 2021-06-09 RX ADMIN — ASPIRIN 81 MG CHEWABLE TABLET 81 MG: 81 TABLET CHEWABLE at 09:12

## 2021-06-09 RX ADMIN — CEFTRIAXONE SODIUM 2 G: 2 INJECTION, POWDER, FOR SOLUTION INTRAMUSCULAR; INTRAVENOUS at 09:11

## 2021-06-09 RX ADMIN — LABETALOL HYDROCHLORIDE 200 MG: 200 TABLET, FILM COATED ORAL at 09:12

## 2021-06-09 RX ADMIN — PRENATAL VITAMINS-IRON FUMARATE 27 MG IRON-FOLIC ACID 0.8 MG TABLET 1 TABLET: at 09:12

## 2021-06-09 RX ADMIN — AZITHROMYCIN MONOHYDRATE 250 MG: 250 TABLET ORAL at 09:12

## 2021-06-09 RX ADMIN — POTASSIUM CHLORIDE 40 MEQ: 1.5 POWDER, FOR SOLUTION ORAL at 11:19

## 2021-06-09 ASSESSMENT — ACTIVITIES OF DAILY LIVING (ADL)
ADLS_ACUITY_SCORE: 11

## 2021-06-09 NOTE — PROVIDER NOTIFICATION
MD Notification    Notified Person: MD    Notified Person Name: Alexandre    Notification Date/Time: 06/09/21 10:06 AM    Notification Interaction: Text Page    Purpose of Notification: PT was wondering about discharging today? She feels great and got her IV antibiotics. Mom can come get anytime.    Orders Received:    Comments:

## 2021-06-09 NOTE — PROGRESS NOTES
"Patient much more comfortable, with steady breathing and no pain. Good FM, no contractions, no obstetric concerns. Hoping to discharge home. Has appt. With Dr. Alcala on  with OB U/S.   BP (!) 138/96 (BP Location: Right arm)   Pulse 92   Temp 97.8  F (36.6  C) (Oral)   Resp 16   Ht 1.651 m (5' 5\")   Wt 87.1 kg (192 lb)   SpO2 99%   BMI 31.95 kg/m     BP: (!) 138/96   Systolic (24hrs), Av , Min:126 , Max:138   Diastolic (24hrs), Av, Min:78, Max:96    Gen--NAD  CV--RRR  Lungs--smooth breathing, symmetric expansion.   Abd--Gravid, NTTP, soft  Ext--Symmetric, no unilateral edema    36yo  here for pneumonia and hypertension exacerbation at 20 weeks gestation.   --BPs stable on labetalol 200mg PO BD. Continue through follow-up appt.   --No obstetric concerns at this time, we will sign off and she can call her OB as needed for concerns.     Bina Cason MD  "

## 2021-06-09 NOTE — DISCHARGE SUMMARY
[facility]  Hospitalist Discharge Summary      Date of Admission:  2021  Date of Discharge:  2021  Discharging Provider: Dorota Schroeder MD, FACP    Discharge Diagnoses   Community-acquired left lower lobe pneumonia  History of mild intermittent asthma:   Mild Hypokalemia  Chronic hypertension  Intrauterine pregnancy at 20 weeks gestation    Follow-ups Needed After Discharge   Follow-up Appointments     Follow-up and recommended labs and tests      Follow up with primary care provider, Physician No Ref-Primary, within 7   days to evaluate treatment change and for hospital follow- up.  No follow   up labs or test are needed.             Unresulted Labs Ordered in the Past 30 Days of this Admission     Date and Time Order Name Status Description    2021 0001 Potassium In process       These results will be followed up by PCP    Discharge Disposition   Discharged to home  Condition at discharge: Stable    Hospital Course   Ema Khan is a 35 year old female admitted on 2021. She is a  35-year-old female currently 20 weeks gestation with past medical history of chronic hypertension.  She presented to the emergency department for evaluation of shortness of breath.  She was found to be tachycardic with leukocytosis.  CT PE negative for pulmonary embolism but noted left lower lobe infiltrate.  Covid swab negative.  She is being admitted in the care of obstetrics and hospitalist consulted for medical management of community-acquired pneumonia    Here are further details regarding her current hospitalization     Community-acquired left lower lobe pneumonia  History of mild intermittent asthma:   Notes previous history of asthma though typically does not require albuterol.  URI going around kids at home.  Notes 24 hours of increasing shortness of breath and productive cough.  Vitals in the emergency department with hypertension, tachycardia and O2 saturation stable on room  air.  Labs with WBC of 20.  CT with left lower lobe infiltrate. COVID negative.  Started on ceftriaxone and azithromycin.     -- Patient care was assumed this morning , seen and examined, significantly SOB on minimal exertion , has to stop between talking to catch breath but has been maintaining her pulse oximetry  -- Continued on ceftriaxone and azithromycin, notes allergy to amoxicillin as a child.    --Albuterol nebulizer available as needed for shortness of breath. Script is given .No signs of acute asthma exacerbation  -- Repeated CBC showing improvement in WBC counts   -- Continue incentive spirometry   -- She will be discharged on Levaquin 750 mg po daily for 7 days   -- Letter to return to work was given      Mild Hypokalemia  --  improved      Chronic hypertension.  History of chronic hypertension though not historically on meds.  Started on labetalol at initial prenatal visit 2021.  Notes she was not taking her medications the past 2-days due to feeling poorly.     --  primary obstetric service.  PTA labetalol is adjusted and BP is well controlled at this point , she will continue taking labetalol 200 mg po BID      Intrauterine pregnancy at 20 weeks gestation  -- Management per OB , continue  multivitamin and F/U with OB/GYN after the discharge     Patient was seen and examined on the day of discharge ,she is feeling well, does not have any complaints , I did review the discharge medications and instructions with the patient and plan for her to follow up with the PCP after the hospitalization .patient was in agreement , she is discharged in stable condition to her home/ TCU.    Consultations This Hospital Stay   None    Code Status   Full Code    Time Spent on this Encounter   I, Dorota Schroeder MD, personally saw the patient today and spent less than or equal to 30 minutes discharging this patient.     Dorota Schroeder MD, [place] [address] [facility]  [address]  [place] [address]  Phone: [phone]  ______________________________________________________________________    Physical Exam   Vital Signs: Temp: 97.8  F (36.6  C) Temp src: Oral BP: (!) 138/96 Pulse: 92   Resp: 16 SpO2: 99 % O2 Device: None (Room air)    Weight: 192 lbs 0 oz    Physical Exam  Vitals signs and nursing note reviewed.   Constitutional:       Appearance: She is well-developed.   HENT:      Head: Normocephalic and atraumatic.   Eyes:      Conjunctiva/sclera: Conjunctivae normal.      Pupils: Pupils are equal, round, and reactive to light.   Neck:      Musculoskeletal: Normal range of motion and neck supple.      Thyroid: No thyromegaly.   Cardiovascular:      Rate and Rhythm: Normal rate and regular rhythm.      Heart sounds: Normal heart sounds. No murmur.   Pulmonary:      Effort: Pulmonary effort is normal. No respiratory distress.      Breath sounds: Normal breath sounds. No wheezing.   Abdominal:      General: Bowel sounds are normal.      Palpations: Abdomen is soft.      Tenderness: There is no abdominal tenderness. There is no guarding or rebound.   Musculoskeletal: Normal range of motion.         General: No deformity.   Skin:     General: Skin is warm and dry.   Neurological:      Mental Status: She is alert and oriented to person, place, and time.   Psychiatric:         Behavior: Behavior normal.          Primary Care Physician   Physician No Ref-Primary    Discharge Orders      Reason for your hospital stay    You were admitted to the hospital secondary to community acquired PNA     Follow-up and recommended labs and tests    Follow up with primary care provider, Physician No Ref-Primary, within 7 days to evaluate treatment change and for hospital follow- up.  No follow up labs or test are needed.     Activity    Your activity upon discharge: activity as tolerated and no driving for today     Discharge Instructions    Please finish the course of antibiotics after the discharge , use  the inhaler as needed for shortness of breath , please continue taking your multi vitamin and labetalol for blood pressure control, keep your follow up appointment with the your obstetrician.     Full Code     Diet    Follow this diet upon discharge: Orders Placed This Encounter      Regular Diet Adult         Significant Results and Procedures   Results for orders placed or performed during the hospital encounter of 06/07/21   CT Chest Pulmonary Embolism w Contrast    Narrative    CT CHEST PULMONARY EMBOLISM WITH CONTRAST 6/7/2021 8:56 AM    CLINICAL HISTORY: Chest Pain. Pulmonary embolus suspected,  low/intermediate prob, positive D-dimer.    TECHNIQUE: CT angiogram chest during arterial phase injection IV  contrast. 2D and 3D MIP reconstructions were performed by the CT  technologist. Dose reduction techniques were used.     CONTRAST: 70mL Isovue-370    COMPARISON: December 28, 2009    FINDINGS:  ANGIOGRAM CHEST: Pulmonary arteries are normal caliber and negative  for pulmonary emboli. Thoracic aorta is negative for dissection. No CT  evidence of right heart strain.    LUNGS AND PLEURA: Left lower lobe pneumonia. No effusion.    MEDIASTINUM/AXILLAE: Soft tissue density with convex borders in the  anterior mediastinum may be some thymic hyperplasia. Entities such as  thymoma could have this appearance. Rounded density in the upper outer  right breast measuring 1.9 cm. This does have density suggestive of  fluid. Consider diagnostic mammography or breast ultrasound to  evaluate for cystic versus solid nature. This was present in 2009 but  smaller, and therefore likely benign. No aneurysm.    UPPER ABDOMEN: No acute findings.    MUSCULOSKELETAL: No frankly destructive bony lesions.      Impression    IMPRESSION:  1.  No pulmonary embolism demonstrated.  2.  Left lower lobe pneumonia.  3.  Soft tissue density with convex borders in the anterior  mediastinum. Differential includes thymic hyperplasia and thymoma.  4.   1.9 cm rounded density in the upper outer right breast is enlarged  since the comparison study, I favor benign etiology but diagnostic  mammography or breast ultrasound is recommended for further  evaluation.    MARY BURT MD       Discharge Medications   Current Discharge Medication List      START taking these medications    Details   albuterol (PROAIR HFA/PROVENTIL HFA/VENTOLIN HFA) 108 (90 Base) MCG/ACT inhaler Inhale 2 puffs into the lungs every 4 hours as needed for shortness of breath / dyspnea or wheezing  Qty: 8 g, Refills: 0    Comments: Pharmacy may dispense brand covered by insurance (Proair, or proventil or ventolin or generic albuterol inhaler)  Associated Diagnoses: Pneumonia of left lung due to infectious organism, unspecified part of lung      levofloxacin (LEVAQUIN) 750 MG tablet Take 1 tablet (750 mg) by mouth daily for 7 days  Qty: 7 tablet, Refills: 0    Associated Diagnoses: Pneumonia of left lung due to infectious organism, unspecified part of lung         CONTINUE these medications which have NOT CHANGED    Details   labetalol (NORMODYNE) 200 MG tablet Take 200 mg by mouth 2 times daily      Prenatal Vit-Fe Fumarate-FA (PRENATAL VITAMIN) 27-0.8 MG TABS Take 1 tablet by mouth daily           Allergies   Allergies   Allergen Reactions     Amoxicillin      Penicillins

## 2021-06-09 NOTE — PLAN OF CARE
Pt Discharged home today with family. AVS gone over with pt in room. Prescriptions sent to the [facility] in [place].

## 2021-06-09 NOTE — PLAN OF CARE
DATE & TIME: overnight    Cognitive Concerns/ Orientation :A&O x4  BEHAVIOR & AGGRESSION TOOL COLOR: Green  CIWA SCORE: NA   ABNL VS/O2: VSS RA  MOBILITY: IND  PAIN MANAGMENT: Denies  DIET: Regular  BOWEL/BLADDER:    ABNL LAB/BG: WBC: 13.6, K+ recheck 3.3 protocol ordered, recheck this am  DRAIN/DEVICES: PIV SL  TELEMETRY RHYTHM: NA  SKIN: WDL  TESTS/PROCEDURES:   D/C DATE: pending per MD maybe tomorrow after IV antibiotics    OTHER IMPORTANT INFO: Pt is 20W6D pregnant, fetal monitoring every shift done by OB, PRUETT pt will ask if neb needed, slept in between cares